# Patient Record
Sex: MALE | Race: WHITE | NOT HISPANIC OR LATINO | URBAN - METROPOLITAN AREA
[De-identification: names, ages, dates, MRNs, and addresses within clinical notes are randomized per-mention and may not be internally consistent; named-entity substitution may affect disease eponyms.]

---

## 2018-08-10 ENCOUNTER — EMERGENCY (EMERGENCY)
Facility: HOSPITAL | Age: 63
LOS: 1 days | End: 2018-08-10
Attending: EMERGENCY MEDICINE | Admitting: EMERGENCY MEDICINE
Payer: COMMERCIAL

## 2018-08-10 VITALS
TEMPERATURE: 98 F | RESPIRATION RATE: 16 BRPM | OXYGEN SATURATION: 100 % | HEIGHT: 70 IN | WEIGHT: 179.9 LBS | DIASTOLIC BLOOD PRESSURE: 72 MMHG | SYSTOLIC BLOOD PRESSURE: 93 MMHG | HEART RATE: 84 BPM

## 2018-08-10 VITALS — OXYGEN SATURATION: 100 % | TEMPERATURE: 99 F | HEART RATE: 69 BPM | RESPIRATION RATE: 16 BRPM

## 2018-08-10 DIAGNOSIS — Z98.89 OTHER SPECIFIED POSTPROCEDURAL STATES: Chronic | ICD-10-CM

## 2018-08-10 DIAGNOSIS — I48.0 PAROXYSMAL ATRIAL FIBRILLATION: ICD-10-CM

## 2018-08-10 DIAGNOSIS — I10 ESSENTIAL (PRIMARY) HYPERTENSION: ICD-10-CM

## 2018-08-10 DIAGNOSIS — E78.00 PURE HYPERCHOLESTEROLEMIA, UNSPECIFIED: ICD-10-CM

## 2018-08-10 LAB
ALBUMIN SERPL ELPH-MCNC: 3.6 G/DL — SIGNIFICANT CHANGE UP (ref 3.3–5)
ALP SERPL-CCNC: 46 U/L — SIGNIFICANT CHANGE UP (ref 30–120)
ALT FLD-CCNC: 51 U/L DA — SIGNIFICANT CHANGE UP (ref 10–60)
ANION GAP SERPL CALC-SCNC: 6 MMOL/L — SIGNIFICANT CHANGE UP (ref 5–17)
APTT BLD: 25.7 SEC — LOW (ref 27.5–37.4)
AST SERPL-CCNC: 22 U/L — SIGNIFICANT CHANGE UP (ref 10–40)
BASOPHILS # BLD AUTO: 0.05 K/UL — SIGNIFICANT CHANGE UP (ref 0–0.2)
BASOPHILS NFR BLD AUTO: 0.4 % — SIGNIFICANT CHANGE UP (ref 0–2)
BILIRUB SERPL-MCNC: 1.2 MG/DL — SIGNIFICANT CHANGE UP (ref 0.2–1.2)
BUN SERPL-MCNC: 32 MG/DL — HIGH (ref 7–23)
CALCIUM SERPL-MCNC: 8.8 MG/DL — SIGNIFICANT CHANGE UP (ref 8.4–10.5)
CHLORIDE SERPL-SCNC: 104 MMOL/L — SIGNIFICANT CHANGE UP (ref 96–108)
CK MB BLD-MCNC: 3.9 % — HIGH (ref 0–3.5)
CK MB CFR SERPL CALC: 2 NG/ML — SIGNIFICANT CHANGE UP (ref 0–3.6)
CK SERPL-CCNC: 51 U/L — SIGNIFICANT CHANGE UP (ref 39–308)
CO2 SERPL-SCNC: 27 MMOL/L — SIGNIFICANT CHANGE UP (ref 22–31)
CREAT SERPL-MCNC: 1.72 MG/DL — HIGH (ref 0.5–1.3)
D DIMER BLD IA.RAPID-MCNC: 214 NG/ML DDU — SIGNIFICANT CHANGE UP
EOSINOPHIL # BLD AUTO: 0.16 K/UL — SIGNIFICANT CHANGE UP (ref 0–0.5)
EOSINOPHIL NFR BLD AUTO: 1.2 % — SIGNIFICANT CHANGE UP (ref 0–6)
GLUCOSE SERPL-MCNC: 119 MG/DL — HIGH (ref 70–99)
HCT VFR BLD CALC: 44.3 % — SIGNIFICANT CHANGE UP (ref 39–50)
HGB BLD-MCNC: 15.1 G/DL — SIGNIFICANT CHANGE UP (ref 13–17)
IMM GRANULOCYTES NFR BLD AUTO: 1.3 % — SIGNIFICANT CHANGE UP (ref 0–1.5)
INR BLD: 1 RATIO — SIGNIFICANT CHANGE UP (ref 0.88–1.16)
LYMPHOCYTES # BLD AUTO: 16.7 % — SIGNIFICANT CHANGE UP (ref 13–44)
LYMPHOCYTES # BLD AUTO: 2.25 K/UL — SIGNIFICANT CHANGE UP (ref 1–3.3)
MAGNESIUM SERPL-MCNC: 2.3 MG/DL — SIGNIFICANT CHANGE UP (ref 1.6–2.6)
MCHC RBC-ENTMCNC: 32.5 PG — SIGNIFICANT CHANGE UP (ref 27–34)
MCHC RBC-ENTMCNC: 34.1 GM/DL — SIGNIFICANT CHANGE UP (ref 32–36)
MCV RBC AUTO: 95.3 FL — SIGNIFICANT CHANGE UP (ref 80–100)
MONOCYTES # BLD AUTO: 1.2 K/UL — HIGH (ref 0–0.9)
MONOCYTES NFR BLD AUTO: 8.9 % — SIGNIFICANT CHANGE UP (ref 2–14)
NEUTROPHILS # BLD AUTO: 9.6 K/UL — HIGH (ref 1.8–7.4)
NEUTROPHILS NFR BLD AUTO: 71.5 % — SIGNIFICANT CHANGE UP (ref 43–77)
PHOSPHATE SERPL-MCNC: 3.6 MG/DL — SIGNIFICANT CHANGE UP (ref 2.5–4.5)
PLATELET # BLD AUTO: 258 K/UL — SIGNIFICANT CHANGE UP (ref 150–400)
POTASSIUM SERPL-MCNC: 4.4 MMOL/L — SIGNIFICANT CHANGE UP (ref 3.5–5.3)
POTASSIUM SERPL-SCNC: 4.4 MMOL/L — SIGNIFICANT CHANGE UP (ref 3.5–5.3)
PROT SERPL-MCNC: 6.8 G/DL — SIGNIFICANT CHANGE UP (ref 6–8.3)
PROTHROM AB SERPL-ACNC: 10.9 SEC — SIGNIFICANT CHANGE UP (ref 9.8–12.7)
RBC # BLD: 4.65 M/UL — SIGNIFICANT CHANGE UP (ref 4.2–5.8)
RBC # FLD: 11.9 % — SIGNIFICANT CHANGE UP (ref 10.3–14.5)
SODIUM SERPL-SCNC: 137 MMOL/L — SIGNIFICANT CHANGE UP (ref 135–145)
TROPONIN I SERPL-MCNC: 0 NG/ML — LOW (ref 0.02–0.06)
WBC # BLD: 13.44 K/UL — HIGH (ref 3.8–10.5)
WBC # FLD AUTO: 13.44 K/UL — HIGH (ref 3.8–10.5)

## 2018-08-10 PROCEDURE — 80053 COMPREHEN METABOLIC PANEL: CPT

## 2018-08-10 PROCEDURE — 85379 FIBRIN DEGRADATION QUANT: CPT

## 2018-08-10 PROCEDURE — 85730 THROMBOPLASTIN TIME PARTIAL: CPT

## 2018-08-10 PROCEDURE — 36415 COLL VENOUS BLD VENIPUNCTURE: CPT

## 2018-08-10 PROCEDURE — 93010 ELECTROCARDIOGRAM REPORT: CPT

## 2018-08-10 PROCEDURE — 93005 ELECTROCARDIOGRAM TRACING: CPT

## 2018-08-10 PROCEDURE — 85610 PROTHROMBIN TIME: CPT

## 2018-08-10 PROCEDURE — 99223 1ST HOSP IP/OBS HIGH 75: CPT

## 2018-08-10 PROCEDURE — 99284 EMERGENCY DEPT VISIT MOD MDM: CPT | Mod: 25

## 2018-08-10 PROCEDURE — 99285 EMERGENCY DEPT VISIT HI MDM: CPT

## 2018-08-10 PROCEDURE — 71045 X-RAY EXAM CHEST 1 VIEW: CPT | Mod: 26

## 2018-08-10 PROCEDURE — 84100 ASSAY OF PHOSPHORUS: CPT

## 2018-08-10 PROCEDURE — 85027 COMPLETE CBC AUTOMATED: CPT

## 2018-08-10 PROCEDURE — 82553 CREATINE MB FRACTION: CPT

## 2018-08-10 PROCEDURE — 84443 ASSAY THYROID STIM HORMONE: CPT

## 2018-08-10 PROCEDURE — 82550 ASSAY OF CK (CPK): CPT

## 2018-08-10 PROCEDURE — 71045 X-RAY EXAM CHEST 1 VIEW: CPT

## 2018-08-10 PROCEDURE — 83735 ASSAY OF MAGNESIUM: CPT

## 2018-08-10 PROCEDURE — 84484 ASSAY OF TROPONIN QUANT: CPT

## 2018-08-10 RX ORDER — SODIUM CHLORIDE 9 MG/ML
1000 INJECTION INTRAMUSCULAR; INTRAVENOUS; SUBCUTANEOUS ONCE
Qty: 0 | Refills: 0 | Status: COMPLETED | OUTPATIENT
Start: 2018-08-10 | End: 2018-08-10

## 2018-08-10 RX ADMIN — SODIUM CHLORIDE 1000 MILLILITER(S): 9 INJECTION INTRAMUSCULAR; INTRAVENOUS; SUBCUTANEOUS at 14:06

## 2018-08-10 RX ADMIN — SODIUM CHLORIDE 1000 MILLILITER(S): 9 INJECTION INTRAMUSCULAR; INTRAVENOUS; SUBCUTANEOUS at 15:06

## 2018-08-10 NOTE — ED PROVIDER NOTE - CONDUCTED A DETAILED DISCUSSION WITH PATIENT AND/OR GUARDIAN REGARDING, MDM
lab results/radiology results need for outpatient follow-up/radiology results/lab results/return to ED if symptoms worsen, persist or questions arise

## 2018-08-10 NOTE — CONSULT NOTE ADULT - SUBJECTIVE AND OBJECTIVE BOX
PCP:    REQUESTING PHYSICIAN:    REASON FOR CONSULT:    CHIEF COMPLAINT:    HPI: 64 yo male presents with complaints of palpitations for the past several days. Pt has a history of P Afib and recently has had neck pain and given pain meds, steroids and has been receiving PT. He denies chest pain or shortness of breath. His lifestyle and activities have not changed. He has followed up with his PMD for the past 3 years. Pt is now in NSR    PAST MEDICAL & SURGICAL HISTORY:  Atrial fibrillation  Hypercholesteremia  Hypertension  History of back surgery      Allergies    No Known Allergies    Intolerances        SOCIAL HISTORY:    FAMILY HISTORY:  No pertinent family history in first degree relatives      MEDICATIONS:  MEDICATIONS  (STANDING):    MEDICATIONS  (PRN):      REVIEW OF SYSTEMS:    CONSTITUTIONAL: No weakness, fevers or chills  EYES/ENT: No visual changes;  No vertigo or throat pain   NECK: No pain or stiffness  RESPIRATORY: No cough, wheezing, hemoptysis; No shortness of breath  CARDIOVASCULAR:palpitations  GASTROINTESTINAL: No abdominal or epigastric pain. No nausea, vomiting, or hematemesis; No diarrhea or constipation. No melena or hematochezia.  GENITOURINARY: No dysuria, frequency or hematuria  NEUROLOGICAL: No numbness or weakness  SKIN: No itching, burning, rashes, or lesions   All other review of systems is negative unless indicated above    Vital Signs Last 24 Hrs  T(C): 36.6 (10 Aug 2018 11:10), Max: 36.6 (10 Aug 2018 11:10)  T(F): 97.8 (10 Aug 2018 11:10), Max: 97.8 (10 Aug 2018 11:10)  HR: 70 (10 Aug 2018 12:26) (70 - 84)  BP: 91/61 (10 Aug 2018 12:26) (91/61 - 106/78)  BP(mean): --  RR: 16 (10 Aug 2018 12:26) (16 - 16)  SpO2: 97% (10 Aug 2018 12:26) (97% - 100%)    I&O's Summary      PHYSICAL EXAM:    Constitutional: NAD, awake and alert, well-developed  HEENT: PERR, EOMI,  No oral cyananosis.  Neck:  supple,  No JVD  Respiratory: Breath sounds are clear bilaterally, No wheezing, rales or rhonchi  Cardiovascular: S1 and S2, regular rate and rhythm, no Murmurs, gallops or rubs  Gastrointestinal: Bowel Sounds present, soft, nontender.   Extremities: No peripheral edema. No clubbing or cyanosis.  Vascular: 2+ peripheral pulses  Neurological: A/O x 3, no focal deficits  Musculoskeletal: no calf tenderness.  Skin: No rashes.      LABS: All Labs Reviewed:                        15.1   13.44 )-----------( 258      ( 10 Aug 2018 11:34 )             44.3     10 Aug 2018 11:34    137    |  104    |  32     ----------------------------<  119    4.4     |  27     |  1.72     Ca    8.8        10 Aug 2018 11:34  Phos  3.6       10 Aug 2018 11:55  Mg     2.3       10 Aug 2018 11:55    TPro  6.8    /  Alb  3.6    /  TBili  1.2    /  DBili  x      /  AST  22     /  ALT  51     /  AlkPhos  46     10 Aug 2018 11:34    PT/INR - ( 10 Aug 2018 11:34 )   PT: 10.9 sec;   INR: 1.00 ratio         PTT - ( 10 Aug 2018 11:34 )  PTT:25.7 sec  CARDIAC MARKERS ( 10 Aug 2018 11:34 )  .000 ng/mL / x     / 51 U/L / x     / 2.0 ng/mL      Blood Culture:         RADIOLOGY/EKG:

## 2018-08-10 NOTE — CONSULT NOTE ADULT - PROBLEM SELECTOR RECOMMENDATION 9
Now in NSR. He will remain on Toprol 100 mg daily. He agrees to follow up with Dr. Murray next week.

## 2018-08-10 NOTE — ED PROVIDER NOTE - CHPI ED SYMPTOMS NEG
no nausea/no syncope/no diaphoresis/no fever/no back pain/no chest pain/no cough/no chills/no vomiting

## 2018-08-10 NOTE — ED ADULT NURSE NOTE - NSIMPLEMENTINTERV_GEN_ALL_ED
Implemented All Universal Safety Interventions:  [place] to call system. Call bell, personal items and telephone within reach. Instruct patient to call for assistance. Room bathroom lighting operational. Non-slip footwear when patient is off stretcher. Physically safe environment: no spills, clutter or unnecessary equipment. Stretcher in lowest position, wheels locked, appropriate side rails in place.

## 2018-08-10 NOTE — ED PROVIDER NOTE - OBJECTIVE STATEMENT
64 y/o M with hx of Afib, HTN, HLD presents with c/o palpitations x few days. States that he has been having intermittent palpitations and rapid heart beat for a few days, worse yesterday. States that he his heart rate would go from 150s-190-s and then to 70's. Pt states that he feels fine now in the ER. States that he saw his PMD, Dr. Leonardo Frankel on 7/13 and noticed to be in mild Afib and had his toprol increased to 100mg daily. Pt last saw Dr. Murray, Cardiologist 3 years ago. States that he called cardiology office today and asked to come to ED. Pt states that he has associated shortness of breath on exertion and occasional dizziness. Denies fever, chest pain, n/v, diaphoresis, syncope, cough, leg swelling/pain or other symptoms. Takes aspirin 325mg daily.

## 2018-08-10 NOTE — ED PROVIDER NOTE - MEDICAL DECISION MAKING DETAILS
62 y/o m with hx of afib, htn here with intermittent rapid heart beat and palpitations x few days, worse since yesterday with SOB on exertion, saw pmd last month was in mild afib and toprol increased to 100mg qd, last saw Cardiologist, Dr. Murray 3 years ago; will get ekg, CE, cardiology consult, re-assess

## 2018-08-10 NOTE — ED PROVIDER NOTE - PROGRESS NOTE DETAILS
Attending Note: 64 y/o M hx of a fib c/o palpitations and rapid heart beat x few days. SOB on exertion. Seen in PMD office last month and noted to be in a fib had his Toprol increased to 100 mg daily. Denies CP, fever. PE - heart beat is regular 70 bpm. lungs clear. Impression - paroxysmal a fib. Plan - CE's, cardiac evaluation by Dr. Murray. Spoke to Cardiologist, Dr. Murray, discussed case and requested a consult. Pt examined by ED attending, Dr. Davenport who agreed with disposition and plan. Pt seen by Dr. Alvarez in ED. Pt in NSR in ED. Labs/ekg reviewed. Pt cleared for discharge by cardiologist, to continue on toprol 100mg daily, f/u Dr. Murray next week. SEE CARDIOLOGY CONSULT NOTE.

## 2018-08-10 NOTE — ED ADULT NURSE NOTE - OBJECTIVE STATEMENT
Pt presents with complaint of feeling dizzy since yesterday  intermittently and feels heart rate is rapid at times. CM RSR with no ectopics noted. Lungs clear on auscultation

## 2018-08-12 ENCOUNTER — RESULT CHARGE (OUTPATIENT)
Age: 63
End: 2018-08-12

## 2018-08-12 LAB — TSH SERPL-MCNC: 2.49 UIU/ML — SIGNIFICANT CHANGE UP (ref 0.27–4.2)

## 2018-08-13 ENCOUNTER — APPOINTMENT (OUTPATIENT)
Dept: CARDIOLOGY | Facility: CLINIC | Age: 63
End: 2018-08-13
Payer: COMMERCIAL

## 2018-08-13 ENCOUNTER — NON-APPOINTMENT (OUTPATIENT)
Age: 63
End: 2018-08-13

## 2018-08-13 VITALS
DIASTOLIC BLOOD PRESSURE: 77 MMHG | HEIGHT: 70 IN | BODY MASS INDEX: 25.77 KG/M2 | OXYGEN SATURATION: 100 % | HEART RATE: 73 BPM | SYSTOLIC BLOOD PRESSURE: 144 MMHG | WEIGHT: 180 LBS

## 2018-08-13 PROBLEM — I48.91 UNSPECIFIED ATRIAL FIBRILLATION: Chronic | Status: ACTIVE | Noted: 2018-08-10

## 2018-08-13 PROCEDURE — 99214 OFFICE O/P EST MOD 30 MIN: CPT | Mod: 25

## 2018-08-13 PROCEDURE — 93000 ELECTROCARDIOGRAM COMPLETE: CPT

## 2018-08-23 ENCOUNTER — APPOINTMENT (OUTPATIENT)
Dept: CARDIOLOGY | Facility: CLINIC | Age: 63
End: 2018-08-23
Payer: COMMERCIAL

## 2018-08-23 PROCEDURE — 93306 TTE W/DOPPLER COMPLETE: CPT

## 2018-08-28 ENCOUNTER — APPOINTMENT (OUTPATIENT)
Dept: CARDIOLOGY | Facility: CLINIC | Age: 63
End: 2018-08-28
Payer: COMMERCIAL

## 2018-08-28 PROCEDURE — 93015 CV STRESS TEST SUPVJ I&R: CPT

## 2018-08-28 PROCEDURE — 78452 HT MUSCLE IMAGE SPECT MULT: CPT

## 2018-08-28 PROCEDURE — A9500: CPT

## 2018-09-24 ENCOUNTER — RESULT CHARGE (OUTPATIENT)
Age: 63
End: 2018-09-24

## 2018-09-24 ENCOUNTER — APPOINTMENT (OUTPATIENT)
Dept: CARDIOLOGY | Facility: CLINIC | Age: 63
End: 2018-09-24
Payer: COMMERCIAL

## 2018-09-24 VITALS
BODY MASS INDEX: 26.05 KG/M2 | SYSTOLIC BLOOD PRESSURE: 124 MMHG | HEART RATE: 72 BPM | OXYGEN SATURATION: 100 % | HEIGHT: 70 IN | DIASTOLIC BLOOD PRESSURE: 75 MMHG | WEIGHT: 182 LBS

## 2018-09-24 PROCEDURE — 99214 OFFICE O/P EST MOD 30 MIN: CPT

## 2018-10-29 ENCOUNTER — APPOINTMENT (OUTPATIENT)
Dept: CARDIOLOGY | Facility: CLINIC | Age: 63
End: 2018-10-29
Payer: COMMERCIAL

## 2018-10-29 ENCOUNTER — NON-APPOINTMENT (OUTPATIENT)
Age: 63
End: 2018-10-29

## 2018-10-29 VITALS — HEART RATE: 66 BPM | OXYGEN SATURATION: 99 % | DIASTOLIC BLOOD PRESSURE: 79 MMHG | SYSTOLIC BLOOD PRESSURE: 136 MMHG

## 2018-10-29 VITALS — WEIGHT: 183 LBS | HEIGHT: 70 IN | BODY MASS INDEX: 26.2 KG/M2

## 2018-10-29 PROCEDURE — 99215 OFFICE O/P EST HI 40 MIN: CPT | Mod: 25

## 2018-10-29 PROCEDURE — 93000 ELECTROCARDIOGRAM COMPLETE: CPT

## 2018-11-03 NOTE — HISTORY OF PRESENT ILLNESS
[FreeTextEntry1] : This is a 62 Y/O gentleman who previously presented ER with CC of palpitations.  \par  Pt returns with complaints of lightheadedness associated with episodes of atrial fibrillation recorded on his phone angle. He denies chest pain or shortness of breath. He denies syncope

## 2018-11-03 NOTE — DISCUSSION/SUMMARY
[FreeTextEntry1] :  Pt will continue current medications. He will be referred to Dr. Chan for possible LINQ insertion.

## 2018-11-03 NOTE — PHYSICAL EXAM
[General Appearance - Well Developed] : well developed [Normal Appearance] : normal appearance [Well Groomed] : well groomed [General Appearance - Well Nourished] : well nourished [No Deformities] : no deformities [General Appearance - In No Acute Distress] : no acute distress [Normal Conjunctiva] : the conjunctiva exhibited no abnormalities [] : no respiratory distress [Respiration, Rhythm And Depth] : normal respiratory rhythm and effort [Exaggerated Use Of Accessory Muscles For Inspiration] : no accessory muscle use [Auscultation Breath Sounds / Voice Sounds] : lungs were clear to auscultation bilaterally [Heart Rate And Rhythm] : heart rate and rhythm were normal [Heart Sounds] : normal S1 and S2 [Abdomen Soft] : soft [Abnormal Walk] : normal gait [FreeTextEntry1] : No LE Edema  [Skin Color & Pigmentation] : normal skin color and pigmentation [Oriented To Time, Place, And Person] : oriented to person, place, and time

## 2018-11-03 NOTE — REASON FOR VISIT
[Follow-Up - Clinic] : a clinic follow-up of [Atrial Fibrillation] : atrial fibrillation [Hyperlipidemia] : hyperlipidemia [Hypertension] : hypertension [Medication Management] : Medication management

## 2020-01-28 ENCOUNTER — APPOINTMENT (OUTPATIENT)
Dept: CARDIOLOGY | Facility: CLINIC | Age: 65
End: 2020-01-28
Payer: COMMERCIAL

## 2020-01-28 ENCOUNTER — NON-APPOINTMENT (OUTPATIENT)
Age: 65
End: 2020-01-28

## 2020-01-28 VITALS — HEIGHT: 70 IN | WEIGHT: 185 LBS | BODY MASS INDEX: 26.48 KG/M2

## 2020-01-28 VITALS — OXYGEN SATURATION: 99 % | SYSTOLIC BLOOD PRESSURE: 136 MMHG | DIASTOLIC BLOOD PRESSURE: 90 MMHG | HEART RATE: 92 BPM

## 2020-01-28 PROCEDURE — 99214 OFFICE O/P EST MOD 30 MIN: CPT

## 2020-01-28 PROCEDURE — 93000 ELECTROCARDIOGRAM COMPLETE: CPT

## 2020-01-28 NOTE — HISTORY OF PRESENT ILLNESS
[FreeTextEntry1] : This is a 63 Y/O gentleman who previously presented ER with CC of palpitations.  \par  Pt returns with complaints of mild shortness of breath with episodes of atrial fibrillation recorded on his phone angle since today, early morning. EKG today Afib heart rate up to 111. Did not go to ED  because most of the time upon arrival at ED he is converted to sinus rhythm. Was referred to Dr. Chan for possible LINQ insertion last visit but not seen yet. \par History of Afib (No AC was taken off 2014 on Aspirin).

## 2020-01-28 NOTE — ASSESSMENT
[FreeTextEntry1] : Afib : Will increase Metoprolol Xl to 150 mg daily. Continue Aspirin. If symptoms don’t resolve or worsens patient recommended to go to ED. \par Recommended to follow up with  Dr. Chan, information given. \par Has appointment with PCP tomorrow. Will have blood works with PCP. \par Follow up in 2 weeks.

## 2020-01-31 ENCOUNTER — EMERGENCY (EMERGENCY)
Facility: HOSPITAL | Age: 65
LOS: 1 days | Discharge: HOME | End: 2020-01-31
Attending: EMERGENCY MEDICINE | Admitting: EMERGENCY MEDICINE
Payer: COMMERCIAL

## 2020-01-31 VITALS
SYSTOLIC BLOOD PRESSURE: 141 MMHG | DIASTOLIC BLOOD PRESSURE: 74 MMHG | OXYGEN SATURATION: 100 % | TEMPERATURE: 98 F | HEART RATE: 61 BPM | RESPIRATION RATE: 14 BRPM

## 2020-01-31 VITALS
RESPIRATION RATE: 14 BRPM | DIASTOLIC BLOOD PRESSURE: 84 MMHG | OXYGEN SATURATION: 99 % | HEART RATE: 98 BPM | SYSTOLIC BLOOD PRESSURE: 153 MMHG | TEMPERATURE: 98 F | HEIGHT: 70 IN | WEIGHT: 184.97 LBS

## 2020-01-31 DIAGNOSIS — Z98.89 OTHER SPECIFIED POSTPROCEDURAL STATES: Chronic | ICD-10-CM

## 2020-01-31 DIAGNOSIS — Z98.890 OTHER SPECIFIED POSTPROCEDURAL STATES: Chronic | ICD-10-CM

## 2020-01-31 DIAGNOSIS — I48.91 UNSPECIFIED ATRIAL FIBRILLATION: ICD-10-CM

## 2020-01-31 DIAGNOSIS — I10 ESSENTIAL (PRIMARY) HYPERTENSION: ICD-10-CM

## 2020-01-31 LAB
ALBUMIN SERPL ELPH-MCNC: 3.8 G/DL — SIGNIFICANT CHANGE UP (ref 3.3–5)
ALP SERPL-CCNC: 53 U/L — SIGNIFICANT CHANGE UP (ref 30–120)
ALT FLD-CCNC: 30 U/L DA — SIGNIFICANT CHANGE UP (ref 10–60)
ANION GAP SERPL CALC-SCNC: 8 MMOL/L — SIGNIFICANT CHANGE UP (ref 5–17)
AST SERPL-CCNC: 19 U/L — SIGNIFICANT CHANGE UP (ref 10–40)
BILIRUB SERPL-MCNC: 1.1 MG/DL — SIGNIFICANT CHANGE UP (ref 0.2–1.2)
BUN SERPL-MCNC: 15 MG/DL — SIGNIFICANT CHANGE UP (ref 7–23)
CALCIUM SERPL-MCNC: 9.2 MG/DL — SIGNIFICANT CHANGE UP (ref 8.4–10.5)
CHLORIDE SERPL-SCNC: 105 MMOL/L — SIGNIFICANT CHANGE UP (ref 96–108)
CO2 SERPL-SCNC: 29 MMOL/L — SIGNIFICANT CHANGE UP (ref 22–31)
CREAT SERPL-MCNC: 1 MG/DL — SIGNIFICANT CHANGE UP (ref 0.5–1.3)
GLUCOSE SERPL-MCNC: 104 MG/DL — HIGH (ref 70–99)
HCT VFR BLD CALC: 44.5 % — SIGNIFICANT CHANGE UP (ref 39–50)
HGB BLD-MCNC: 14.9 G/DL — SIGNIFICANT CHANGE UP (ref 13–17)
MAGNESIUM SERPL-MCNC: 2.3 MG/DL — SIGNIFICANT CHANGE UP (ref 1.6–2.6)
MCHC RBC-ENTMCNC: 32 PG — SIGNIFICANT CHANGE UP (ref 27–34)
MCHC RBC-ENTMCNC: 33.5 GM/DL — SIGNIFICANT CHANGE UP (ref 32–36)
MCV RBC AUTO: 95.7 FL — SIGNIFICANT CHANGE UP (ref 80–100)
NRBC # BLD: 0 /100 WBCS — SIGNIFICANT CHANGE UP (ref 0–0)
PLATELET # BLD AUTO: 202 K/UL — SIGNIFICANT CHANGE UP (ref 150–400)
POTASSIUM SERPL-MCNC: 4.1 MMOL/L — SIGNIFICANT CHANGE UP (ref 3.5–5.3)
POTASSIUM SERPL-SCNC: 4.1 MMOL/L — SIGNIFICANT CHANGE UP (ref 3.5–5.3)
PROT SERPL-MCNC: 7 G/DL — SIGNIFICANT CHANGE UP (ref 6–8.3)
RBC # BLD: 4.65 M/UL — SIGNIFICANT CHANGE UP (ref 4.2–5.8)
RBC # FLD: 11.9 % — SIGNIFICANT CHANGE UP (ref 10.3–14.5)
SODIUM SERPL-SCNC: 142 MMOL/L — SIGNIFICANT CHANGE UP (ref 135–145)
TROPONIN I SERPL-MCNC: 0 NG/ML — LOW (ref 0.02–0.06)
WBC # BLD: 7 K/UL — SIGNIFICANT CHANGE UP (ref 3.8–10.5)
WBC # FLD AUTO: 7 K/UL — SIGNIFICANT CHANGE UP (ref 3.8–10.5)

## 2020-01-31 PROCEDURE — 99284 EMERGENCY DEPT VISIT MOD MDM: CPT

## 2020-01-31 PROCEDURE — 84484 ASSAY OF TROPONIN QUANT: CPT

## 2020-01-31 PROCEDURE — 71046 X-RAY EXAM CHEST 2 VIEWS: CPT | Mod: 26

## 2020-01-31 PROCEDURE — 83735 ASSAY OF MAGNESIUM: CPT

## 2020-01-31 PROCEDURE — 85027 COMPLETE CBC AUTOMATED: CPT

## 2020-01-31 PROCEDURE — 80053 COMPREHEN METABOLIC PANEL: CPT

## 2020-01-31 PROCEDURE — 71046 X-RAY EXAM CHEST 2 VIEWS: CPT

## 2020-01-31 PROCEDURE — 36415 COLL VENOUS BLD VENIPUNCTURE: CPT

## 2020-01-31 PROCEDURE — 99223 1ST HOSP IP/OBS HIGH 75: CPT

## 2020-01-31 PROCEDURE — 93010 ELECTROCARDIOGRAM REPORT: CPT

## 2020-01-31 PROCEDURE — 99284 EMERGENCY DEPT VISIT MOD MDM: CPT | Mod: 25

## 2020-01-31 PROCEDURE — 93005 ELECTROCARDIOGRAM TRACING: CPT

## 2020-01-31 RX ORDER — SODIUM CHLORIDE 9 MG/ML
1000 INJECTION INTRAMUSCULAR; INTRAVENOUS; SUBCUTANEOUS ONCE
Refills: 0 | Status: COMPLETED | OUTPATIENT
Start: 2020-01-31 | End: 2020-01-31

## 2020-01-31 RX ORDER — METOPROLOL TARTRATE 50 MG
1 TABLET ORAL
Qty: 0 | Refills: 0 | DISCHARGE

## 2020-01-31 RX ORDER — LISINOPRIL 2.5 MG/1
1 TABLET ORAL
Qty: 0 | Refills: 0 | DISCHARGE

## 2020-01-31 RX ADMIN — SODIUM CHLORIDE 1000 MILLILITER(S): 9 INJECTION INTRAMUSCULAR; INTRAVENOUS; SUBCUTANEOUS at 13:20

## 2020-01-31 RX ADMIN — SODIUM CHLORIDE 1000 MILLILITER(S): 9 INJECTION INTRAMUSCULAR; INTRAVENOUS; SUBCUTANEOUS at 12:20

## 2020-01-31 NOTE — ED PROVIDER NOTE - CLINICAL SUMMARY MEDICAL DECISION MAKING FREE TEXT BOX
sensation of irregular heartbeat 4 days with some mild occ shortness of breath. history of a-fib. symptoms seem to have resolved on arrival to ED. no chest pain. will order EKG, labs, trop, CXR, consult cardiology

## 2020-01-31 NOTE — ED ADULT NURSE NOTE - CHPI ED NUR SYMPTOMS NEG
no dizziness/no diaphoresis/no back pain/no syncope/no vomiting/no chest pain/no chills/no congestion/no fever/no nausea

## 2020-01-31 NOTE — ED PROVIDER NOTE - NSFOLLOWUPINSTRUCTIONS_ED_ALL_ED_FT
drink plenty of fluids.   continue toprol as prescribed  start flecainde daily  follow up with your cardiologist next week      A-fib (Atrial Fibrillation)    WHAT YOU NEED TO KNOW:    A-fib may come and go, or it may be a long-term condition. A-fib can cause blood clots, stroke, or heart failure. These conditions may become life-threatening. It is important to treat and manage a-fib to help prevent a blood clot, stroke, or heart failure. Heart Chambers         DISCHARGE INSTRUCTIONS:    Call 911 for any of the following:     You have any of the following signs of a heart attack:   Squeezing, pressure, or pain in your chest      You may also have any of the following:   Discomfort or pain in your back, neck, jaw, stomach, or arm      Shortness of breath      Nausea or vomiting      Lightheadedness or a sudden cold sweat      You have any of the following signs of a stroke:   Numbness or drooping on one side of your face       Weakness in an arm or leg      Confusion or difficulty speaking      Dizziness, a severe headache, or vision loss    Return to the emergency department if: You have any of the following signs of a blood clot:     You feel lightheaded, are short of breath, and have chest pain.      You cough up blood.      You have swelling, redness, pain, or warmth in your arm or leg.    Contact your cardiologist or healthcare provider if:     Your heart rate is more than 110 beats per minute.      You have new or worsening swelling in your legs, feet, ankles, or abdomen.       You are short of breath, even at rest.       You have questions or concerns about your condition or care.     Medicines: You may need any of the following:     Heart medicines help control your heart rate or rhythm. You may need more than one medicine to treat your symptoms.       Blood thinners help prevent blood clots. Clots can cause strokes, heart attacks, and death. The following are general safety guidelines to follow while you are taking a blood thinner:  Watch for bleeding and bruising while you take blood thinners. Watch for bleeding from your gums or nose. Watch for blood in your urine and bowel movements. Use a soft washcloth on your skin, and a soft toothbrush to brush your teeth. This can keep your skin and gums from bleeding. If you shave, use an electric shaver. Do not play contact sports.       Tell your dentist and other healthcare providers that you take a blood thinner. Wear a bracelet or necklace that says you take this medicine.       Do not start or stop any other medicines unless your healthcare provider tells you to. Many medicines cannot be used with blood thinners.      Take your blood thinner exactly as prescribed by your healthcare provider. Do not skip does or take less than prescribed. Tell your provider right away if you forget to take your blood thinner, or if you take too much.      Warfarin is a blood thinner that you may need to take. The following are things you should be aware of if you take warfarin:   Foods and medicines can affect the amount of warfarin in your blood. Do not make major changes to your diet while you take warfarin. Warfarin works best when you eat about the same amount of vitamin K every day. Vitamin K is found in green leafy vegetables and certain other foods. Ask for more information about what to eat when you are taking warfarin.      You will need to see your healthcare provider for follow-up visits when you are on warfarin. You will need regular blood tests. These tests are used to decide how much medicine you need.       Antiplatelets, such as aspirin, help prevent blood clots. Take your antiplatelet medicine exactly as directed. These medicines make it more likely for you to bleed or bruise. If you are told to take aspirin, do not take acetaminophen or ibuprofen instead.       Take your medicine as directed. Contact your healthcare provider if you think your medicine is not helping or if you have side effects. Tell him or her if you are allergic to any medicine. Keep a list of the medicines, vitamins, and herbs you take. Include the amounts, and when and why you take them. Bring the list or the pill bottles to follow-up visits. Carry your medicine list with you in case of an emergency.    Follow up with your cardiologist as directed: You will need regular blood tests and monitoring. Write down your questions so you remember to ask them during your visits.     Manage A-fib:     Know your target heart rate. Learn how to take your pulse and monitor your heart rate.      Manage other health conditions. This includes high blood pressure, sleep apnea, thyroid disease, diabetes, and other heart conditions. Take medicine as directed and follow your treatment plan.       Limit or do not drink alcohol. Alcohol can make a-fib hard to manage. Ask your healthcare provider if it is safe for you to drink alcohol. A drink of alcohol is 12 ounces of beer, 5 ounces of wine, or 1½ ounces of liquor.       Do not smoke. Nicotine and other chemicals in cigarettes and cigars can cause heart and lung damage. Ask your healthcare provider for information if you currently smoke and need help to quit. E-cigarettes or smokeless tobacco still contain nicotine. Talk to your healthcare provider before you use these products.       Eat heart-healthy foods. Heart healthy foods will help keep your cholesterol low. These include fruits, vegetables, whole-grain breads, low-fat dairy products, beans, lean meats, and fish. Replace butter and margarine with heart-healthy oils such as olive oil and canola oil.       Maintain a healthy weight. Ask your healthcare provider how much you should weigh. Ask him or her to help you create a weight loss plan if you are overweight.       Exercise for 30 minutes most days of the week. Ask your healthcare provider about the best exercise plan for you.

## 2020-01-31 NOTE — ED PROVIDER NOTE - OBJECTIVE STATEMENT
64 year old male with history of HTN, HLD, and A-fib presents with sensation of irregular heart beat and shortness of breath with exertion on and off the past 4 days. no chest pain. no modifying factors. history of similar symptoms in the past due to a-fib. feels like "he's in a-fib". states he usually converts on his own. was seen in his cardiologist's office 2 days ago because he was in the area. was told to increase his toprol dose from 100mg to 150 mg, rest, and hydrate. patient reports no improvement so came to ED. on arrival to ED, patient states symptoms seem to have resolved.  PCP Keven Alvarez

## 2020-01-31 NOTE — ED PROVIDER NOTE - ATTENDING CONTRIBUTION TO CARE
65 yo M p/w co slight palpitations x past ~ 4 days. very mild assoc dyspnea. No chest pain. no abd pain. no n/v/d. no fever/chills/recent illness. no numb/ting/focal weak. no recent trauma. no recent travel / imob. no agg/allev factors. no other co. Pts sx resolved after arrival to ed.  Exam: MM Moist. neck supple . no meningeal signs. abd soft NT, no hsm. no cvat. RRR S1S2. nl lung exam. No other acute findings.  Labs, xr, cardio consultation

## 2020-01-31 NOTE — CONSULT NOTE ADULT - PROBLEM SELECTOR RECOMMENDATION 9
paroxysmal w/ worsening frequency of episodes in the past week.  Recommend rhythm control w/ flecainide 50 mg q12hrs.  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  Cont.  daily.  Follow up in cardiology clinic in 1 week.  OK to d/c from cardiology standpoint w/ FU in 1 week in office.

## 2020-01-31 NOTE — ED PROVIDER NOTE - CARE PROVIDER_API CALL
Avel Alvarez (MD)  Cardiovascular Disease  [address] [place] [address]  Phone: [phone]  Fax: [phone]  Follow Up Time: 7-10 Days

## 2020-01-31 NOTE — CONSULT NOTE ADULT - SUBJECTIVE AND OBJECTIVE BOX
63 y/o w/ PAF, HTN, HLP presents to ED w/ palpitations.  ECG on arrrival showed NSR.  Pt states over past week has had multiple  episodes of palpitations, dyspnea consistent w/ afib episodes he   has had in the past.  H/o afib x years.  In past episodes infrequent, duration of episodes 10-20 minutes  by months.  In the past week,  have occurred multiple times a day 10-20 minutes duration.  Has to stop what he is doing when symptoms occur.  Outside of these episodes denies any cardiac symptoms:  no chest pain, dyspnea, palpitations, lightheadness syncope.    Seen 2 days ago in cardiology clinic, was in afib in 110s.  metoprolol succ 100 daily uptitrated to 150 mg daily.    Has remained in NSR for entire time during stay in ER.      Cardiac testing in clinic unremarkable as listed below:  EK2020, Afib   Stress Test: 2018, no Ischemia   Echo: 2018 LVEF 60%.     PAST MEDICAL & SURGICAL HISTORY:  Atrial fibrillation  Hypercholesteremia  Hypertension  History of arthroscopy of right knee  History of back surgery: L5,S1 -       SOCIAL HISTORY: neg x 3    FAMILY HISTORY:  No pertinent family history in first degree relatives    ALLERGIES:  Allergies    No Known Allergies  Intolerances    MEDICATIONS:    MEDICATIONS  (STANDING):    Aspirin 325 MG Oral Tablet; TAKE 1 TABLET DAILY  [facility] Vitamin B12 1000 MCG Oral Tablet; TAKE 1 TABLET DAILY AS DIRECTED  Lisinopril 10 MG Oral Tablet; TAKE 1 TABLET DAILY  Metoprolol Succinate  MG Oral Tablet Extended Release 24 Hour; TAKE 1 TABLET  DAILY  Simvastatin 40 MG Oral Tablet; Take 1 tablet daily    MEDICATIONS  (PRN):      REVIEW OF SYSTEMS:    CONSTITUTIONAL: No weakness, fevers or chills  EYES/ENT: No visual changes;  No vertigo or throat pain   NECK: No pain or stiffness  RESPIRATORY: No cough, wheezing, hemoptysis; No shortness of breath  CARDIOVASCULAR: No chest pain or palpitations  GASTROINTESTINAL: No abdominal or epigastric pain. No nausea, vomiting, or hematemesis; No diarrhea or constipation. No melena or hematochezia.  GENITOURINARY: No dysuria, frequency or hematuria  NEUROLOGICAL: No numbness or weakness  SKIN: No itching, burning, rashes, or lesions   All other review of systems is negative unless indicated above    Vital Signs Last 24 Hrs  T(C): 36.6 (2020 11:48), Max: 36.6 (2020 11:48)  T(F): 97.8 (2020 11:48), Max: 97.8 (2020 11:48)  HR: 67 (2020 14:39) (59 - 98)  BP: 130/71 (2020 14:39) (130/71 - 175/81)  BP(mean): --  RR: 13 (2020 14:39) (13 - 20)  SpO2: 99% (2020 14:39) (99% - 100%)Daily Height in cm: 177.8 (2020 11:48)    Daily I&O's Summary    2020 07:01  -  2020 15:26  --------------------------------------------------------  IN: 1000 mL / OUT: 0 mL / NET: 1000 mL        PHYSICAL EXAM:    Constitutional: NAD, awake and alert,   HEENT: PERR, EOMI,    Neck:  supple,  No JVD  Respiratory: Breath sounds are clear bilaterally, No wheezing, rales or rhonchi  Cardiovascular: S1 and S2, regular rate and rhythm, no Murmurs, gallops or rubs  Gastrointestinal: Bowel Sounds present, soft, nontender.   Extremities: No peripheral edema. No clubbing or cyanosis.  Vascular: 2+ peripheral pulses  Neurological: A/O x 3, no focal deficits  Musculoskeletal: no calf tenderness.  Skin: No rashes.      LABS: All Labs Reviewed:                        14.9   7.00  )-----------( 202      ( 2020 12:10 )             44.5     2020 12:10    142    |  105    |  15     ----------------------------<  104    4.1     |  29     |  1.00     Ca    9.2        2020 12:10  Mg     2.3       2020 12:10    TPro  7.0    /  Alb  3.8    /  TBili  1.1    /  DBili  x      /  AST  19     /  ALT  30     /  AlkPhos  53     2020 12:10      CARDIAC MARKERS ( 2020 12:10 )  .000 ng/mL / x     / x     / x     / x          RADIOLOGY/EKG:  NSR, normal QRS, normal QTc    ECHO/CARDIAC CATHTERIZATION/STRESS TEST:  see above.

## 2020-01-31 NOTE — ED PROVIDER NOTE - CARE PLAN
Principal Discharge DX:	Atrial fibrillation, unspecified type  Secondary Diagnosis:	Shortness of breath

## 2020-01-31 NOTE — ED ADULT NURSE NOTE - OBJECTIVE STATEMENT
Patient presents c/o rapid heartbeat on and off for 4 days, h/o afib. C/O shortness of breath with the rapid heartbeat. Denies CP. Patient saw cardiologist 2 days ago and was told to increase his metoprolol to 150MG daily but the symptoms persist. Patient uses a phone angle to measure his heartrate and at times had reading of 160-200 when feeling the rapid rhythm. Patient found to be in a normal sinus rhythm upon placement of monitor leads and states he felt his rhythm normalize when he arrived to the ED.

## 2020-01-31 NOTE — ED PROVIDER NOTE - PATIENT PORTAL LINK FT
You can access the FollowMyHealth Patient Portal offered by [facility] by registering at the following website: [email or web address]. By joining [facility]’s FollowMyHealth portal, you will also be able to view your health information using other applications (apps) compatible with our system.

## 2020-01-31 NOTE — ED PROVIDER NOTE - PROGRESS NOTE DETAILS
spoke with Dr. Carranza (cards). will see patient in ED in a couple hours. will continue to monitor in ED. patient informed of plan Dr. Carrnaza at bedside. patient has been monitored past few hours in ED and has remained in sinus rhythm Reevaluated patient at bedside.  no pain or complaints. Discussed the results of all diagnostic testing in ED and copies of all reports given.  Per Dr. Carranza, will start flecainide (was called in to pharmacy by him). will follow up in office next week.  An opportunity to ask questions was given.  Discussed the importance of prompt, close medical follow-up.  Patient will return with any changes, concerns or persistent / worsening symptoms.  Understanding of all instructions verbalized.

## 2020-02-17 ENCOUNTER — NON-APPOINTMENT (OUTPATIENT)
Age: 65
End: 2020-02-17

## 2020-02-17 ENCOUNTER — APPOINTMENT (OUTPATIENT)
Dept: CARDIOLOGY | Facility: CLINIC | Age: 65
End: 2020-02-17
Payer: COMMERCIAL

## 2020-02-17 VITALS — HEIGHT: 70 IN | WEIGHT: 180 LBS | BODY MASS INDEX: 25.77 KG/M2

## 2020-02-17 VITALS — HEART RATE: 66 BPM | DIASTOLIC BLOOD PRESSURE: 66 MMHG | OXYGEN SATURATION: 100 % | SYSTOLIC BLOOD PRESSURE: 105 MMHG

## 2020-02-17 PROCEDURE — 93000 ELECTROCARDIOGRAM COMPLETE: CPT

## 2020-02-17 PROCEDURE — 99214 OFFICE O/P EST MOD 30 MIN: CPT | Mod: 25

## 2020-02-18 NOTE — HISTORY OF PRESENT ILLNESS
[FreeTextEntry1] :   Presented today to office for follow up , currently in normal sinus rhythm. Tolerating increased dose of Metoprolol. Denies chest pain, shortness of breath, dyspnea on exertion, palpitations, orthopnea, paroxysmal nocturnal dyspnea, claudication, dizziness, lightheadedness, presyncopal or syncopal symptoms. \par History of Afib (No AC was taken off 2014 on Aspirin).

## 2020-02-18 NOTE — REASON FOR VISIT
[Atrial Fibrillation] : atrial fibrillation [Follow-Up - Clinic] : a clinic follow-up of [Hyperlipidemia] : hyperlipidemia [Hypertension] : hypertension [Medication Management] : Medication management

## 2020-02-18 NOTE — PHYSICAL EXAM
[General Appearance - Well Developed] : well developed [Normal Appearance] : normal appearance [Well Groomed] : well groomed [General Appearance - Well Nourished] : well nourished [No Deformities] : no deformities [General Appearance - In No Acute Distress] : no acute distress [Normal Conjunctiva] : the conjunctiva exhibited no abnormalities [Respiration, Rhythm And Depth] : normal respiratory rhythm and effort [] : no respiratory distress [Exaggerated Use Of Accessory Muscles For Inspiration] : no accessory muscle use [Auscultation Breath Sounds / Voice Sounds] : lungs were clear to auscultation bilaterally [Heart Rate And Rhythm] : heart rate and rhythm were normal [Heart Sounds] : normal S1 and S2 [Abdomen Soft] : soft [Abnormal Walk] : normal gait [Skin Color & Pigmentation] : normal skin color and pigmentation [Oriented To Time, Place, And Person] : oriented to person, place, and time [FreeTextEntry1] : No LE Edema

## 2020-02-18 NOTE — ASSESSMENT
[FreeTextEntry1] : Afib Currently Sinus rhythm Continue current medication regimen. Will arrange appointment with Dr Chan for next week.   \par . \par Follow after EP appointment.

## 2020-02-21 ENCOUNTER — NON-APPOINTMENT (OUTPATIENT)
Age: 65
End: 2020-02-21

## 2020-02-21 ENCOUNTER — APPOINTMENT (OUTPATIENT)
Dept: ELECTROPHYSIOLOGY | Facility: CLINIC | Age: 65
End: 2020-02-21
Payer: COMMERCIAL

## 2020-02-21 VITALS
HEART RATE: 66 BPM | BODY MASS INDEX: 25.48 KG/M2 | SYSTOLIC BLOOD PRESSURE: 126 MMHG | HEIGHT: 70 IN | OXYGEN SATURATION: 100 % | WEIGHT: 178 LBS | DIASTOLIC BLOOD PRESSURE: 80 MMHG

## 2020-02-21 DIAGNOSIS — J42 ACUTE BRONCHITIS, UNSPECIFIED: ICD-10-CM

## 2020-02-21 DIAGNOSIS — J20.9 ACUTE BRONCHITIS, UNSPECIFIED: ICD-10-CM

## 2020-02-21 PROCEDURE — 93000 ELECTROCARDIOGRAM COMPLETE: CPT

## 2020-02-21 PROCEDURE — 99205 OFFICE O/P NEW HI 60 MIN: CPT

## 2020-02-21 RX ORDER — ACETAMINOPHEN/DIPHENHYDRAMINE 500MG-25MG
1000 TABLET ORAL DAILY
Refills: 0 | Status: ACTIVE | COMMUNITY

## 2020-02-21 RX ORDER — LISINOPRIL 10 MG/1
10 TABLET ORAL DAILY
Qty: 30 | Refills: 1 | Status: ACTIVE | COMMUNITY

## 2020-02-21 NOTE — DISCUSSION/SUMMARY
[FreeTextEntry1] : 65 yo male with HTN and paroxysmal atrial fibrillation, known to have normal LV Function, LVEF 60%. \par Rhythm control strategy is beneficial for symptomatic AF, he is on Flecainide 50mg bid and toprolol xl 150mg daily\par -adviced pt to start ac Eliquis 5mg bid, chadsvasc 1 for htn, stop [id number] \par -discussed option for AF monitoring LINQ is reasonable option and pt prefers to have longterm cardiac monitoring for AF burden\par -if he has more recurrent episodes of AF he will be good candidate for catheter ablation procedure\par rtc in 4 months\par \par

## 2020-02-21 NOTE — PHYSICAL EXAM
[General Appearance - Well Developed] : well developed [Normal Appearance] : normal appearance [Well Groomed] : well groomed [General Appearance - Well Nourished] : well nourished [No Deformities] : no deformities [Normal Conjunctiva] : the conjunctiva exhibited no abnormalities [General Appearance - In No Acute Distress] : no acute distress [Heart Rate And Rhythm] : heart rate and rhythm were normal [Heart Sounds] : normal S1 and S2 [] : no respiratory distress [Respiration, Rhythm And Depth] : normal respiratory rhythm and effort [Exaggerated Use Of Accessory Muscles For Inspiration] : no accessory muscle use [Auscultation Breath Sounds / Voice Sounds] : lungs were clear to auscultation bilaterally [Abdomen Soft] : soft [Abnormal Walk] : normal gait [Oriented To Time, Place, And Person] : oriented to person, place, and time [Skin Color & Pigmentation] : normal skin color and pigmentation [FreeTextEntry1] : No LE Edema

## 2020-03-02 ENCOUNTER — OUTPATIENT (OUTPATIENT)
Dept: OUTPATIENT SERVICES | Facility: HOSPITAL | Age: 65
LOS: 1 days | Discharge: HOME | End: 2020-03-02
Payer: COMMERCIAL

## 2020-03-02 VITALS
DIASTOLIC BLOOD PRESSURE: 75 MMHG | HEART RATE: 62 BPM | RESPIRATION RATE: 16 BRPM | OXYGEN SATURATION: 100 % | TEMPERATURE: 97 F | SYSTOLIC BLOOD PRESSURE: 155 MMHG

## 2020-03-02 VITALS
DIASTOLIC BLOOD PRESSURE: 80 MMHG | RESPIRATION RATE: 18 BRPM | TEMPERATURE: 97 F | HEART RATE: 68 BPM | OXYGEN SATURATION: 100 % | SYSTOLIC BLOOD PRESSURE: 150 MMHG

## 2020-03-02 DIAGNOSIS — Z98.89 OTHER SPECIFIED POSTPROCEDURAL STATES: Chronic | ICD-10-CM

## 2020-03-02 DIAGNOSIS — Z98.890 OTHER SPECIFIED POSTPROCEDURAL STATES: Chronic | ICD-10-CM

## 2020-03-02 DIAGNOSIS — I48.91 UNSPECIFIED ATRIAL FIBRILLATION: ICD-10-CM

## 2020-03-02 PROCEDURE — C1764: CPT

## 2020-03-02 PROCEDURE — 33285 INSJ SUBQ CAR RHYTHM MNTR: CPT

## 2020-03-02 NOTE — PROCEDURE NOTE - GENERAL PROCEDURE DETAILS
successful implant of ILR at the left suprapectoral region in left parasternal area, hemostasis achieved, incision closed with 4.0 monocryl suture and dermabond

## 2020-03-02 NOTE — PROCEDURAL SAFETY CHECKLIST WITH OR WITHOUT SEDATION - NSREADY4TIMEOUT_GEN_ALL_CORE
Consult received. Full note to follow.    Please call for questions.    Thanks,  Kallie Queen MD  Infectious Disease Fellow, PGY-5  Pager: [phone] or ext: [phone]  [facility]       done

## 2020-03-02 NOTE — PACU DISCHARGE NOTE - COMMENTS
discharge instructions given to patient. Answered all questions. Verbalized understanding. All belongings are with the patient, including home monitor device. Left the unit with girlfriend at 1430

## 2020-03-03 DIAGNOSIS — R06.02 SHORTNESS OF BREATH: ICD-10-CM

## 2020-03-03 DIAGNOSIS — Z79.82 LONG TERM (CURRENT) USE OF ASPIRIN: ICD-10-CM

## 2020-03-03 DIAGNOSIS — E78.00 PURE HYPERCHOLESTEROLEMIA, UNSPECIFIED: ICD-10-CM

## 2020-03-03 DIAGNOSIS — I48.91 UNSPECIFIED ATRIAL FIBRILLATION: ICD-10-CM

## 2020-03-03 DIAGNOSIS — I10 ESSENTIAL (PRIMARY) HYPERTENSION: ICD-10-CM

## 2020-03-19 ENCOUNTER — APPOINTMENT (OUTPATIENT)
Dept: ELECTROPHYSIOLOGY | Facility: CLINIC | Age: 65
End: 2020-03-19

## 2020-04-02 ENCOUNTER — APPOINTMENT (OUTPATIENT)
Dept: ELECTROPHYSIOLOGY | Facility: CLINIC | Age: 65
End: 2020-04-02
Payer: COMMERCIAL

## 2020-04-02 PROCEDURE — G2066: CPT

## 2020-04-02 PROCEDURE — 93298 REM INTERROG DEV EVAL SCRMS: CPT

## 2020-04-06 PROBLEM — I73.00 RAYNAUD'S SYNDROME WITHOUT GANGRENE: Chronic | Status: ACTIVE | Noted: 2020-03-02

## 2020-04-27 ENCOUNTER — APPOINTMENT (OUTPATIENT)
Dept: ELECTROPHYSIOLOGY | Facility: CLINIC | Age: 65
End: 2020-04-27

## 2020-05-04 ENCOUNTER — APPOINTMENT (OUTPATIENT)
Dept: ELECTROPHYSIOLOGY | Facility: CLINIC | Age: 65
End: 2020-05-04
Payer: COMMERCIAL

## 2020-05-04 PROCEDURE — 93298 REM INTERROG DEV EVAL SCRMS: CPT

## 2020-05-04 PROCEDURE — G2066: CPT

## 2020-05-26 RX ORDER — CEFDINIR 300 MG/1
300 CAPSULE ORAL DAILY
Refills: 0 | Status: DISCONTINUED | COMMUNITY
Start: 2020-02-21 | End: 2020-05-26

## 2020-05-26 RX ORDER — BENZONATATE 100 MG/1
100 CAPSULE ORAL
Refills: 0 | Status: DISCONTINUED | COMMUNITY
Start: 2020-02-21 | End: 2020-05-26

## 2020-05-28 ENCOUNTER — APPOINTMENT (OUTPATIENT)
Dept: ELECTROPHYSIOLOGY | Facility: CLINIC | Age: 65
End: 2020-05-28
Payer: COMMERCIAL

## 2020-05-28 VITALS
SYSTOLIC BLOOD PRESSURE: 155 MMHG | BODY MASS INDEX: 24.91 KG/M2 | DIASTOLIC BLOOD PRESSURE: 83 MMHG | HEART RATE: 60 BPM | WEIGHT: 174 LBS | HEIGHT: 70 IN | OXYGEN SATURATION: 100 %

## 2020-05-28 PROCEDURE — 99215 OFFICE O/P EST HI 40 MIN: CPT

## 2020-05-28 PROCEDURE — 93291 INTERROG DEV EVAL SCRMS IP: CPT

## 2020-05-28 NOTE — HISTORY OF PRESENT ILLNESS
[FreeTextEntry1] :  65 yo male with HTN and paroxysmal atrial fibrillation initially dx in 2015-16, known to have normal LV Function, LVEF 60%. Patient has occasional bothersome palpitations from afib despite being on toprolol xl 150mg. Palpitations are more severe and prolonged, also more frequent. Last episode was in 1/28/20 with RVR to 200 bpm. He is on Flecainide 50mg bid. \par s/p ILR implant that shows AF episode [id number] RVR to 154 bpm, he feels palpitations and sob during AF\par chadsvasc 1 (HTN), not on AC but on A/\par MPI 8/2018 shows no evidence of ischemia

## 2020-05-28 NOTE — DISCUSSION/SUMMARY
[FreeTextEntry1] : 64 yo male with HTN and paroxysmal atrial fibrillation, known to have normal LV Function, LVEF 60%. \par Rhythm control strategy is beneficial for symptomatic AF, he is on Flecainide 50mg bid and toprolol xl 150mg daily\par -compliance with Eliquis 5mg bid, chadsvasc 1 for htn \par -discussed rhythm control treatments increase flecainide or ablation prcoedure as more effective therapy, pt would like to wait and see if symptoms are more progressive \par rtc in 4 months\par \par

## 2020-05-28 NOTE — REASON FOR VISIT
[Follow-Up - Clinic] : a clinic follow-up of [Atrial Fibrillation] : atrial fibrillation [FreeTextEntry1] : Dr Moreno

## 2020-05-28 NOTE — PHYSICAL EXAM
[General Appearance - Well Developed] : well developed [Normal Appearance] : normal appearance [Well Groomed] : well groomed [General Appearance - Well Nourished] : well nourished [No Deformities] : no deformities [General Appearance - In No Acute Distress] : no acute distress [Normal Conjunctiva] : the conjunctiva exhibited no abnormalities [] : no respiratory distress [Respiration, Rhythm And Depth] : normal respiratory rhythm and effort [Auscultation Breath Sounds / Voice Sounds] : lungs were clear to auscultation bilaterally [Exaggerated Use Of Accessory Muscles For Inspiration] : no accessory muscle use [Heart Rate And Rhythm] : heart rate and rhythm were normal [Abdomen Soft] : soft [Heart Sounds] : normal S1 and S2 [Abnormal Walk] : normal gait [Skin Color & Pigmentation] : normal skin color and pigmentation [FreeTextEntry1] : No LE Edema  [Oriented To Time, Place, And Person] : oriented to person, place, and time

## 2020-06-20 NOTE — ED ADULT NURSE NOTE - CHPI ED NUR AGGRAVATING FX
Price (Do Not Change): 0.00 Instructions: This plan will send the code FBSD to the PM system.  DO NOT or CHANGE the price. Detail Level: Simple none

## 2020-06-29 ENCOUNTER — APPOINTMENT (OUTPATIENT)
Dept: ELECTROPHYSIOLOGY | Facility: CLINIC | Age: 65
End: 2020-06-29
Payer: COMMERCIAL

## 2020-06-29 PROCEDURE — G2066: CPT

## 2020-06-29 PROCEDURE — 93298 REM INTERROG DEV EVAL SCRMS: CPT

## 2020-08-02 ENCOUNTER — APPOINTMENT (OUTPATIENT)
Dept: ELECTROPHYSIOLOGY | Facility: CLINIC | Age: 65
End: 2020-08-02
Payer: COMMERCIAL

## 2020-08-03 PROCEDURE — G2066: CPT

## 2020-08-03 PROCEDURE — 93298 REM INTERROG DEV EVAL SCRMS: CPT

## 2020-09-08 ENCOUNTER — APPOINTMENT (OUTPATIENT)
Dept: ELECTROPHYSIOLOGY | Facility: CLINIC | Age: 65
End: 2020-09-08
Payer: COMMERCIAL

## 2020-09-09 PROCEDURE — G2066: CPT

## 2020-09-09 PROCEDURE — 93298 REM INTERROG DEV EVAL SCRMS: CPT

## 2020-09-24 ENCOUNTER — APPOINTMENT (OUTPATIENT)
Dept: ELECTROPHYSIOLOGY | Facility: CLINIC | Age: 65
End: 2020-09-24

## 2020-09-24 ENCOUNTER — RECORD ABSTRACTING (OUTPATIENT)
Age: 65
End: 2020-09-24

## 2020-09-29 NOTE — DISCUSSION/SUMMARY
[FreeTextEntry1] : 64 yo male with HTN and paroxysmal atrial fibrillation, known to have normal LV Function, LVEF 60%. \par Rhythm control strategy is beneficial for symptomatic AF, he is on Flecainide 50mg bid and toprolol xl 150mg daily\par -compliance with Eliquis 5mg bid, chadsvasc 1 for htn \par \par \par

## 2020-09-29 NOTE — HISTORY OF PRESENT ILLNESS
[FreeTextEntry1] :  66 yo male with HTN and paroxysmal atrial fibrillation initially dx in 2015-16, known to have normal LV Function, LVEF 60%. Patient has occasional bothersome palpitations from afib despite being on toprolol xl 150mg. Palpitations are more severe and prolonged, also more frequent. Last episode was in 1/28/20 with RVR to 200 bpm. He is on Flecainide 50mg bid. \par s/p ILR implant that shows AF episode [id number] RVR to 154 bpm, he feels palpitations and sob during AF\par chadsvasc 1 (HTN), not on AC but on A/\par MPI 8/2018 shows no evidence of ischemia

## 2020-10-12 ENCOUNTER — APPOINTMENT (OUTPATIENT)
Dept: ELECTROPHYSIOLOGY | Facility: CLINIC | Age: 65
End: 2020-10-12
Payer: COMMERCIAL

## 2020-10-12 PROCEDURE — G2066: CPT

## 2020-10-12 PROCEDURE — 93298 REM INTERROG DEV EVAL SCRMS: CPT

## 2020-11-16 ENCOUNTER — APPOINTMENT (OUTPATIENT)
Dept: ELECTROPHYSIOLOGY | Facility: CLINIC | Age: 65
End: 2020-11-16
Payer: COMMERCIAL

## 2020-11-16 PROCEDURE — G2066: CPT

## 2020-11-16 PROCEDURE — 93298 REM INTERROG DEV EVAL SCRMS: CPT

## 2020-12-07 ENCOUNTER — APPOINTMENT (OUTPATIENT)
Dept: ELECTROPHYSIOLOGY | Facility: CLINIC | Age: 65
End: 2020-12-07

## 2020-12-07 NOTE — PHYSICAL EXAM
[Normal Appearance] : normal appearance [] : no respiratory distress [Respiration, Rhythm And Depth] : normal respiratory rhythm and effort [Heart Rate And Rhythm] : heart rate and rhythm were normal [Heart Sounds] : normal S1 and S2 [Bowel Sounds] : normal bowel sounds [Abnormal Walk] : normal gait [Skin Color & Pigmentation] : normal skin color and pigmentation [Oriented To Time, Place, And Person] : oriented to person, place, and time [Impaired Insight] : insight and judgment were intact

## 2020-12-07 NOTE — HISTORY OF PRESENT ILLNESS
[FreeTextEntry1] :  66 yo male with HTN and paroxysmal atrial fibrillation initially dx in 2015-16, known to have normal LV Function, LVEF 60%. Patient has occasional bothersome palpitations from afib despite being on toprolol xl 150mg. Palpitations are more severe and prolonged, also more frequent. Last episode was in 1/28/20 with RVR to 200 bpm. He is on Flecainide 50mg bid. \par s/p ILR implant that shows AF episode 5x23pib RVR to 154 bpm, he feels palpitations and sob during AF\par chadsvasc 1 (HTN), not on AC but on A/\par MPI 8/2018 shows no evidence of ischemia

## 2020-12-20 ENCOUNTER — APPOINTMENT (OUTPATIENT)
Dept: ELECTROPHYSIOLOGY | Facility: CLINIC | Age: 65
End: 2020-12-20
Payer: COMMERCIAL

## 2020-12-21 PROCEDURE — G2066: CPT

## 2020-12-21 PROCEDURE — 93298 REM INTERROG DEV EVAL SCRMS: CPT

## 2021-01-21 ENCOUNTER — APPOINTMENT (OUTPATIENT)
Dept: ELECTROPHYSIOLOGY | Facility: CLINIC | Age: 66
End: 2021-01-21
Payer: COMMERCIAL

## 2021-01-21 PROCEDURE — G2066: CPT

## 2021-01-21 PROCEDURE — 93298 REM INTERROG DEV EVAL SCRMS: CPT

## 2021-01-26 NOTE — HISTORY OF PRESENT ILLNESS
[FreeTextEntry1] :  63 yo male with HTN and paroxysmal atrial fibrillation initially dx in 2015-16, known to have normal LV Function, LVEF 60%. Patient has occasional bothersome palpitations from afib despite being on toprolol xl 150mg. Palpitations are more severe and prolonged, also more frequent. Last episode was in 1/28/20 with RVR to 200 bpm. He is on Flecainide 50mg bid. \par Denies chest pain, shortness of breath, dyspnea on exertion, dizziness, syncopal symptoms. \par chadsvasc 1 (HTN), not on AC but on [id number]\par MPI 8/2018 shows no evidence of ischemia
[FreeTextEntry6] : Nasal Endoscopy (43332)\par \par After informed verbal consent, nasal endoscopy was performed due to anterior rhinoscopy insufficient to account for symptoms.  [ Flexible ][ Rigid ], scope # [] was used.  Topical vasoconstrictor and anesthetic was used.  The exam showed a deviated septum to [ right ][ left ][ midline ]. \par \par The nasal endoscope is passed via the right nasal cavity. The inferior, middle, and superior turbinates responded to vasoconstrictors. The inferior, middle and superior meati were examined. The osteomeatal complex is clear with no polyposis or purulence. The sphenoethmoidal recess is clear with no polyposis or purulence. The choana is patent. \par \par The nasal endoscope is passed via the left nasal cavity. The inferior, middle, and superior turbinates responded to vasoconstrictors. The inferior, middle and superior meati were examined. The osteomeatal complex is clear with no polyposis or purulence. The sphenoethmoidal recess is clear with no polyposis or purulence. The choana is patent.  \par \par There is no obstruction of the nasopharynx with adenoid tissue.\par \par

## 2021-02-05 ENCOUNTER — NON-APPOINTMENT (OUTPATIENT)
Age: 66
End: 2021-02-05

## 2021-02-05 ENCOUNTER — APPOINTMENT (OUTPATIENT)
Dept: CARDIOLOGY | Facility: CLINIC | Age: 66
End: 2021-02-05
Payer: COMMERCIAL

## 2021-02-05 VITALS
OXYGEN SATURATION: 99 % | TEMPERATURE: 97.8 F | HEART RATE: 72 BPM | WEIGHT: 191 LBS | DIASTOLIC BLOOD PRESSURE: 60 MMHG | SYSTOLIC BLOOD PRESSURE: 110 MMHG | HEIGHT: 70 IN | BODY MASS INDEX: 27.35 KG/M2

## 2021-02-05 PROCEDURE — 93000 ELECTROCARDIOGRAM COMPLETE: CPT

## 2021-02-05 PROCEDURE — 99072 ADDL SUPL MATRL&STAF TM PHE: CPT

## 2021-02-05 PROCEDURE — 99214 OFFICE O/P EST MOD 30 MIN: CPT

## 2021-02-05 RX ORDER — MOMETASONE FUROATE AND FORMOTEROL FUMARATE DIHYDRATE 100; 5 UG/1; UG/1
100-5 AEROSOL RESPIRATORY (INHALATION)
Refills: 0 | Status: DISCONTINUED | COMMUNITY
Start: 2020-02-21 | End: 2021-02-05

## 2021-02-05 NOTE — HISTORY OF PRESENT ILLNESS
[FreeTextEntry1] :   Presented today to office for follow up , currently in normal sinus rhythm. Taking flecinide and has had no reported episodes of atrial fibrillation lately according to EP reports. Pt denies chest pain or shortness of breath. EP notes reviewed.

## 2021-02-05 NOTE — ASSESSMENT
[FreeTextEntry1] : Afib Currently Sinus rhythm Continue current medication regimen. Pt will consider ablation for PAF. He will follow up with Dr. Lopez\par Pt is planning to have an epidural. There are no cardiac contraindications for planned epidural. He may stop DOAC for 3 days prior to the procedure.

## 2021-02-24 ENCOUNTER — APPOINTMENT (OUTPATIENT)
Dept: ELECTROPHYSIOLOGY | Facility: CLINIC | Age: 66
End: 2021-02-24
Payer: COMMERCIAL

## 2021-02-25 PROCEDURE — 93298 REM INTERROG DEV EVAL SCRMS: CPT

## 2021-02-25 PROCEDURE — G2066: CPT

## 2021-03-02 ENCOUNTER — NON-APPOINTMENT (OUTPATIENT)
Age: 66
End: 2021-03-02

## 2021-03-31 ENCOUNTER — APPOINTMENT (OUTPATIENT)
Dept: ELECTROPHYSIOLOGY | Facility: CLINIC | Age: 66
End: 2021-03-31
Payer: COMMERCIAL

## 2021-04-01 ENCOUNTER — NON-APPOINTMENT (OUTPATIENT)
Age: 66
End: 2021-04-01

## 2021-04-01 PROCEDURE — 93298 REM INTERROG DEV EVAL SCRMS: CPT

## 2021-04-01 PROCEDURE — G2066: CPT

## 2021-04-05 ENCOUNTER — APPOINTMENT (OUTPATIENT)
Dept: NEUROSURGERY | Facility: CLINIC | Age: 66
End: 2021-04-05
Payer: COMMERCIAL

## 2021-04-05 VITALS
SYSTOLIC BLOOD PRESSURE: 151 MMHG | DIASTOLIC BLOOD PRESSURE: 83 MMHG | TEMPERATURE: 98.1 F | WEIGHT: 180 LBS | OXYGEN SATURATION: 100 % | HEART RATE: 73 BPM | BODY MASS INDEX: 25.77 KG/M2 | HEIGHT: 70 IN

## 2021-04-05 PROCEDURE — 99072 ADDL SUPL MATRL&STAF TM PHE: CPT

## 2021-04-05 PROCEDURE — 99203 OFFICE O/P NEW LOW 30 MIN: CPT

## 2021-04-06 RX ORDER — CHROMIUM 200 MCG
1000 TABLET ORAL
Refills: 0 | Status: ACTIVE | COMMUNITY

## 2021-04-06 RX ORDER — ROSUVASTATIN CALCIUM 20 MG/1
20 TABLET, FILM COATED ORAL
Qty: 90 | Refills: 0 | Status: ACTIVE | COMMUNITY
Start: 2021-02-22

## 2021-04-08 NOTE — ASU PATIENT PROFILE, ADULT - NSALCOHOLPROBLEMSRELYN_GEN_A_CORE_SD
Constitutional: No fever or chills.   Skin: See HPI  Except as documented in the HPI, all other systems are negative.
no

## 2021-04-16 ENCOUNTER — NON-APPOINTMENT (OUTPATIENT)
Age: 66
End: 2021-04-16

## 2021-04-25 NOTE — CONSULT LETTER
[Dear  ___] : Dear  [unfilled], [Courtesy Letter:] : I had the pleasure of seeing your patient, [unfilled], in my office today. [Sincerely,] : Sincerely, [FreeTextEntry2] : Leonardo Frankel, DO\par [address]\[place] [address] [FreeTextEntry1] : This very pleasant 66-year-old right-handed gentleman who is otherwise well presents with longstanding history of lower back pain with now progressive radiating symptoms down the right greater than left leg.  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  There is no specific onset event.  The patient did have previous hemilaminectomy for an L5S1 disc herniation in the 1990s.  The patient indicates strong intense middle back pain at the level of the iliac crests.  He has difficulties with walking or standing for prolonged periods of time and indicates he will get some numbness and weakness in the legs.  In addition with prolonged activity he has typical sciatica pain running through the right buttock and down the foot.  Bowel and bladder function have remained normal.  The patient has undergone epidural steroid injections in the past but none recently because of side effects.  The patient has been seen by PT and he does physical therapy stretches and exercises on a consistent basis.\par \par I have reviewed with the patient his MRI scan of the lumbar spine performed at [facility] radiology on October 16, 2020.  These images clearly show extensive degenerative spondylotic changes.  Of particular importance at the L3-4 and L4-5 level there is significant stenosis with ligament hypertrophy and facet arthropathy in addition to degenerative disc which is contributing to significant central stenosis as well as foraminal stenosis.  At L4-5 there is a synovial cyst which is causing more deflection of the right exiting nerve root.  There is a slight spondylolisthesis at L4-5.  I have also reviewed with the patient his dynamic standing flexion-extension x-rays which were performed at [facility] these images show a very minimal but observable movement at the L4-5 level.\par \par On examination the patient is clearly in discomfort with his lower back.  There is pain with range of motion which is more prominently muscular in nature.  Straight leg raise is positive on the right-hand side at approximately 30 to 40 degrees of elevation.  Flexion and external rotation of the hip joints is negative with respect to capitulated and the patient's symptoms.  The patella reflexes are symmetric and normal.  The right and left ankle reflexes are decreased compared to the patellar reflexes and the right ankle reflex is less than the left.  Is no clonus.  The patient does have some weakness of dorsiflexion on the right-hand side.  Plantar flexion and knee extension is otherwise intact.\par \par The patient has undergone appropriate conservative measures including physical therapy as well as epidural steroid injections.  The patient's symptoms correlate well with significant spondylotic changes causing central stenosis.  In addition the synovial cyst seen at L4-5 on the right-hand side is consistent with the patient's right sciatica.  The patient is appropriate for decompression laminectomy to decompress the L3-4 and L4-5 levels as well as to deal with the right-handed synovial cyst.  I have discussed with the patient the possibility of needing a fusion because of the mild degree of dynamic movement seen on the x-ray.  However the patient indicates that he is thinking about retiring from his current line of work and would like to avoid diffusion procedure if possible.  I have therefore used surgical models as well as the patient's own imaging to discuss an L4 laminectomy and possible partial L3 laminectomy and L5 laminectomy in order to achieve good decompression across L3-4 and L4-5 with foraminotomies being performed bilaterally.  I have discussed the potential risks and complications of this surgery including those of general anesthesia, the creation of further instability requiring fusion at a later date, cerebrospinal fluid leak, persistent or new neurologic symptoms, infection, and postoperative pain and recovery expectations.  At the end of our discussion the patient indicated good understanding and that all questions had been addressed.  The patient has provided consent to move forward with surgery as soon as he can find a gap in his current workload and after he is achieved cardiac clearance.  The patient currently has an implanted loop recorder.\par \par Thank you for very kindly including me in the evaluation and support of your patient.  Please do not hesitate to contact me should you have any questions or concerns regarding this evaluation, patient's diagnosis, or the recommendation for surgical decompression surgery with laminectomy in the lumbar spine. [FreeTextEntry3] : Riley Benoit MD, PhD, FRCPSC\par  of Neurosurgery\par [facility]\par  of Neurosurgery\par [facility]\par [address] [place] [address], [address] Floor\[place] [address]\par Tel: [phone]\par FAX: [phone]\par

## 2021-04-25 NOTE — REASON FOR VISIT
[New Patient Visit] : a new patient visit [FreeTextEntry1] : lower back pain with right sciatica and neurogenic claudication

## 2021-04-29 ENCOUNTER — RESULT REVIEW (OUTPATIENT)
Age: 66
End: 2021-04-29

## 2021-04-29 ENCOUNTER — OUTPATIENT (OUTPATIENT)
Dept: OUTPATIENT SERVICES | Facility: HOSPITAL | Age: 66
LOS: 1 days | End: 2021-04-29
Payer: COMMERCIAL

## 2021-04-29 VITALS
HEART RATE: 67 BPM | WEIGHT: 179.9 LBS | OXYGEN SATURATION: 98 % | SYSTOLIC BLOOD PRESSURE: 149 MMHG | RESPIRATION RATE: 18 BRPM | HEIGHT: 70 IN | DIASTOLIC BLOOD PRESSURE: 76 MMHG | TEMPERATURE: 98 F

## 2021-04-29 DIAGNOSIS — M43.17 SPONDYLOLISTHESIS, LUMBOSACRAL REGION: ICD-10-CM

## 2021-04-29 DIAGNOSIS — Z98.89 OTHER SPECIFIED POSTPROCEDURAL STATES: Chronic | ICD-10-CM

## 2021-04-29 DIAGNOSIS — Z98.890 OTHER SPECIFIED POSTPROCEDURAL STATES: Chronic | ICD-10-CM

## 2021-04-29 DIAGNOSIS — M47.816 SPONDYLOSIS WITHOUT MYELOPATHY OR RADICULOPATHY, LUMBAR REGION: ICD-10-CM

## 2021-04-29 DIAGNOSIS — Z01.818 ENCOUNTER FOR OTHER PREPROCEDURAL EXAMINATION: ICD-10-CM

## 2021-04-29 LAB
A1C WITH ESTIMATED AVERAGE GLUCOSE RESULT: 5.1 % — SIGNIFICANT CHANGE UP (ref 4–5.6)
ALBUMIN SERPL ELPH-MCNC: 3.9 G/DL — SIGNIFICANT CHANGE UP (ref 3.3–5)
ANION GAP SERPL CALC-SCNC: 3 MMOL/L — LOW (ref 5–17)
APPEARANCE UR: CLEAR — SIGNIFICANT CHANGE UP
APTT BLD: 35.3 SEC — SIGNIFICANT CHANGE UP (ref 27.5–35.5)
BASOPHILS # BLD AUTO: 0.05 K/UL — SIGNIFICANT CHANGE UP (ref 0–0.2)
BASOPHILS NFR BLD AUTO: 0.6 % — SIGNIFICANT CHANGE UP (ref 0–2)
BILIRUB UR-MCNC: NEGATIVE — SIGNIFICANT CHANGE UP
BUN SERPL-MCNC: 17 MG/DL — SIGNIFICANT CHANGE UP (ref 7–23)
CALCIUM SERPL-MCNC: 9.6 MG/DL — SIGNIFICANT CHANGE UP (ref 8.5–10.1)
CHLORIDE SERPL-SCNC: 105 MMOL/L — SIGNIFICANT CHANGE UP (ref 96–108)
CO2 SERPL-SCNC: 30 MMOL/L — SIGNIFICANT CHANGE UP (ref 22–31)
COLOR SPEC: YELLOW — SIGNIFICANT CHANGE UP
CREAT SERPL-MCNC: 1.31 MG/DL — HIGH (ref 0.5–1.3)
DIFF PNL FLD: NEGATIVE — SIGNIFICANT CHANGE UP
EOSINOPHIL # BLD AUTO: 0.18 K/UL — SIGNIFICANT CHANGE UP (ref 0–0.5)
EOSINOPHIL NFR BLD AUTO: 2.1 % — SIGNIFICANT CHANGE UP (ref 0–6)
ESTIMATED AVERAGE GLUCOSE: 100 MG/DL — SIGNIFICANT CHANGE UP (ref 68–114)
GLUCOSE SERPL-MCNC: 103 MG/DL — HIGH (ref 70–99)
GLUCOSE UR QL: NEGATIVE MG/DL — SIGNIFICANT CHANGE UP
HCT VFR BLD CALC: 44.4 % — SIGNIFICANT CHANGE UP (ref 39–50)
HGB BLD-MCNC: 14.9 G/DL — SIGNIFICANT CHANGE UP (ref 13–17)
IMM GRANULOCYTES NFR BLD AUTO: 0.2 % — SIGNIFICANT CHANGE UP (ref 0–1.5)
INR BLD: 1.25 RATIO — HIGH (ref 0.88–1.16)
KETONES UR-MCNC: NEGATIVE — SIGNIFICANT CHANGE UP
LEUKOCYTE ESTERASE UR-ACNC: NEGATIVE — SIGNIFICANT CHANGE UP
LYMPHOCYTES # BLD AUTO: 1.93 K/UL — SIGNIFICANT CHANGE UP (ref 1–3.3)
LYMPHOCYTES # BLD AUTO: 22.4 % — SIGNIFICANT CHANGE UP (ref 13–44)
MCHC RBC-ENTMCNC: 31.8 PG — SIGNIFICANT CHANGE UP (ref 27–34)
MCHC RBC-ENTMCNC: 33.6 GM/DL — SIGNIFICANT CHANGE UP (ref 32–36)
MCV RBC AUTO: 94.7 FL — SIGNIFICANT CHANGE UP (ref 80–100)
MONOCYTES # BLD AUTO: 0.71 K/UL — SIGNIFICANT CHANGE UP (ref 0–0.9)
MONOCYTES NFR BLD AUTO: 8.2 % — SIGNIFICANT CHANGE UP (ref 2–14)
NEUTROPHILS # BLD AUTO: 5.74 K/UL — SIGNIFICANT CHANGE UP (ref 1.8–7.4)
NEUTROPHILS NFR BLD AUTO: 66.5 % — SIGNIFICANT CHANGE UP (ref 43–77)
NITRITE UR-MCNC: NEGATIVE — SIGNIFICANT CHANGE UP
PH UR: 6 — SIGNIFICANT CHANGE UP (ref 5–8)
PLATELET # BLD AUTO: 220 K/UL — SIGNIFICANT CHANGE UP (ref 150–400)
POTASSIUM SERPL-MCNC: 4.4 MMOL/L — SIGNIFICANT CHANGE UP (ref 3.5–5.3)
POTASSIUM SERPL-SCNC: 4.4 MMOL/L — SIGNIFICANT CHANGE UP (ref 3.5–5.3)
PROT UR-MCNC: NEGATIVE MG/DL — SIGNIFICANT CHANGE UP
PROTHROM AB SERPL-ACNC: 14.5 SEC — HIGH (ref 10.6–13.6)
RBC # BLD: 4.69 M/UL — SIGNIFICANT CHANGE UP (ref 4.2–5.8)
RBC # FLD: 11.9 % — SIGNIFICANT CHANGE UP (ref 10.3–14.5)
SODIUM SERPL-SCNC: 138 MMOL/L — SIGNIFICANT CHANGE UP (ref 135–145)
SP GR SPEC: 1.01 — SIGNIFICANT CHANGE UP (ref 1.01–1.02)
UROBILINOGEN FLD QL: 1 MG/DL
WBC # BLD: 8.63 K/UL — SIGNIFICANT CHANGE UP (ref 3.8–10.5)
WBC # FLD AUTO: 8.63 K/UL — SIGNIFICANT CHANGE UP (ref 3.8–10.5)

## 2021-04-29 PROCEDURE — 83036 HEMOGLOBIN GLYCOSYLATED A1C: CPT

## 2021-04-29 PROCEDURE — 86901 BLOOD TYPING SEROLOGIC RH(D): CPT

## 2021-04-29 PROCEDURE — 80048 BASIC METABOLIC PNL TOTAL CA: CPT

## 2021-04-29 PROCEDURE — 85025 COMPLETE CBC W/AUTO DIFF WBC: CPT

## 2021-04-29 PROCEDURE — 86850 RBC ANTIBODY SCREEN: CPT

## 2021-04-29 PROCEDURE — 85610 PROTHROMBIN TIME: CPT

## 2021-04-29 PROCEDURE — 71046 X-RAY EXAM CHEST 2 VIEWS: CPT

## 2021-04-29 PROCEDURE — G0463: CPT | Mod: 25

## 2021-04-29 PROCEDURE — 81003 URINALYSIS AUTO W/O SCOPE: CPT

## 2021-04-29 PROCEDURE — 82040 ASSAY OF SERUM ALBUMIN: CPT

## 2021-04-29 PROCEDURE — 71046 X-RAY EXAM CHEST 2 VIEWS: CPT | Mod: 26

## 2021-04-29 PROCEDURE — 87641 MR-STAPH DNA AMP PROBE: CPT

## 2021-04-29 PROCEDURE — 87640 STAPH A DNA AMP PROBE: CPT

## 2021-04-29 PROCEDURE — 36415 COLL VENOUS BLD VENIPUNCTURE: CPT

## 2021-04-29 PROCEDURE — 85730 THROMBOPLASTIN TIME PARTIAL: CPT

## 2021-04-29 PROCEDURE — 86900 BLOOD TYPING SEROLOGIC ABO: CPT

## 2021-04-29 RX ORDER — ASPIRIN/CALCIUM CARB/MAGNESIUM 324 MG
1 TABLET ORAL
Qty: 0 | Refills: 0 | DISCHARGE

## 2021-04-29 RX ORDER — SIMVASTATIN 20 MG/1
1 TABLET, FILM COATED ORAL
Qty: 0 | Refills: 0 | DISCHARGE

## 2021-04-29 RX ORDER — APIXABAN 2.5 MG/1
0 TABLET, FILM COATED ORAL
Qty: 0 | Refills: 0 | DISCHARGE

## 2021-04-29 NOTE — H&P PST ADULT - NSANTHOSAYNRD_GEN_A_CORE
No. SIL screening performed.  STOP BANG Legend: 0-2 = LOW Risk; 3-4 = INTERMEDIATE Risk; 5-8 = HIGH Risk

## 2021-04-29 NOTE — H&P PST ADULT - ASSESSMENT
66 year old man presents to [facility] for preprocedure exam. Patient is for planned L4 total laminectomy with L3/4 and L4/5 foraminal decompression with Dr Benoit on 5/11.        Plan:  - PST instructions given ; NPO status instructions to be given by ASU .  - Pt instructed to take following meds with sip of water : metorpolol, fleicanide, lisinopril   - Eliquis as per cardiologist recommendations - he is seeing Dr Alvarez on tuesday next week   - Pt instructed to take routine evening medications unless indicated .  -  Stop NSAIDS ( Aspirin Alev Motrin Mobic Diclofenac), herbal supplements , MVI , Vitamin fish oil 7 days prior to surgery  unless   directed by surgeon or cardiologist;   - Medical Optimization  with Dr Alvarez  - EZ wash instructions given & mupirocin instructions given. Instructed to use Mupirocin on ly if he gets a phone call from PST staff   - Labs EKG CXR as per surgeon request.   -  Pt instructed to self quarantine .  - Covid Testing scheduled

## 2021-04-29 NOTE — H&P PST ADULT - HISTORY OF PRESENT ILLNESS
66 year old man presents to [facility] for preprocedure exam. Patient is for planned L4 total laminectomy with L3/4 and L4/5 foraminal decompression with Dr Benoit on 5/11. Patient with complaints of back pain no radiation, worse with activity, he does not take anything for pain.

## 2021-04-29 NOTE — H&P PST ADULT - NSICDXPASTMEDICALHX_GEN_ALL_CORE_FT
PAST MEDICAL HISTORY:  Atrial fibrillation     Hypercholesteremia     Hypertension     Lumbar stenosis     Raynaud's disease without gangrene

## 2021-04-29 NOTE — H&P PST ADULT - NSICDXPASTSURGICALHX_GEN_ALL_CORE_FT
PAST SURGICAL HISTORY:  History of arthroscopy of right knee     History of back surgery L5,S1 - 1990

## 2021-04-30 DIAGNOSIS — Z01.818 ENCOUNTER FOR OTHER PREPROCEDURAL EXAMINATION: ICD-10-CM

## 2021-04-30 DIAGNOSIS — M43.17 SPONDYLOLISTHESIS, LUMBOSACRAL REGION: ICD-10-CM

## 2021-04-30 PROBLEM — M48.061 SPINAL STENOSIS, LUMBAR REGION WITHOUT NEUROGENIC CLAUDICATION: Chronic | Status: ACTIVE | Noted: 2021-04-29

## 2021-04-30 LAB
MRSA PCR RESULT.: SIGNIFICANT CHANGE UP
S AUREUS DNA NOSE QL NAA+PROBE: SIGNIFICANT CHANGE UP

## 2021-05-03 ENCOUNTER — APPOINTMENT (OUTPATIENT)
Dept: ELECTROPHYSIOLOGY | Facility: CLINIC | Age: 66
End: 2021-05-03
Payer: COMMERCIAL

## 2021-05-03 ENCOUNTER — NON-APPOINTMENT (OUTPATIENT)
Age: 66
End: 2021-05-03

## 2021-05-03 PROCEDURE — 93298 REM INTERROG DEV EVAL SCRMS: CPT

## 2021-05-03 PROCEDURE — G2066: CPT

## 2021-05-04 ENCOUNTER — APPOINTMENT (OUTPATIENT)
Dept: CARDIOLOGY | Facility: CLINIC | Age: 66
End: 2021-05-04
Payer: COMMERCIAL

## 2021-05-04 ENCOUNTER — NON-APPOINTMENT (OUTPATIENT)
Age: 66
End: 2021-05-04

## 2021-05-04 VITALS
SYSTOLIC BLOOD PRESSURE: 145 MMHG | HEART RATE: 63 BPM | TEMPERATURE: 98.4 F | BODY MASS INDEX: 25.77 KG/M2 | HEIGHT: 70 IN | WEIGHT: 180 LBS | DIASTOLIC BLOOD PRESSURE: 84 MMHG | OXYGEN SATURATION: 99 %

## 2021-05-04 VITALS — DIASTOLIC BLOOD PRESSURE: 76 MMHG | SYSTOLIC BLOOD PRESSURE: 128 MMHG

## 2021-05-04 DIAGNOSIS — Z01.810 ENCOUNTER FOR PREPROCEDURAL CARDIOVASCULAR EXAMINATION: ICD-10-CM

## 2021-05-04 DIAGNOSIS — I48.91 UNSPECIFIED ATRIAL FIBRILLATION: ICD-10-CM

## 2021-05-04 PROCEDURE — 93000 ELECTROCARDIOGRAM COMPLETE: CPT | Mod: NC

## 2021-05-04 PROCEDURE — 99072 ADDL SUPL MATRL&STAF TM PHE: CPT

## 2021-05-04 PROCEDURE — 99214 OFFICE O/P EST MOD 30 MIN: CPT

## 2021-05-04 RX ORDER — GABAPENTIN 300 MG/1
300 CAPSULE ORAL
Qty: 30 | Refills: 0 | Status: DISCONTINUED | COMMUNITY
Start: 2020-10-20 | End: 2021-05-04

## 2021-05-04 RX ORDER — TIZANIDINE 2 MG/1
2 TABLET ORAL
Qty: 30 | Refills: 0 | Status: DISCONTINUED | COMMUNITY
Start: 2020-10-20 | End: 2021-05-04

## 2021-05-04 RX ORDER — METOPROLOL SUCCINATE 50 MG/1
50 TABLET, EXTENDED RELEASE ORAL
Qty: 90 | Refills: 0 | Status: DISCONTINUED | COMMUNITY
Start: 2021-03-22 | End: 2021-05-04

## 2021-05-04 NOTE — HISTORY OF PRESENT ILLNESS
[FreeTextEntry1] : Riley Choudhary is a 66 year old man with a history of paroxysmal atrial fibrillation, hypertension, hyperlipidemia, who presents for cardiac examination prior to upcoming spine surgery.  He describes progressively worsening pain related to spinal stenosis and will be having surgery with Dr. Benoit at [facility] next week.  He has been doing well from a cardiac standpoint and describes a good baseline functional status (works as a ).  He has not been experiencing angina, dyspnea, or syncope.\par \par His atrial fibrillation is paroxysmal and was diagnosed in 2014; currently being treated with flecainide under the management of Dr. Lopez (electrophysiologist); he has an implanted loop recorder.  He has been tolerating anticoagulation.\par

## 2021-05-04 NOTE — PHYSICAL EXAM
[Well Developed] : well developed [Well Nourished] : well nourished [No Acute Distress] : no acute distress [Normal S1, S2] : normal S1, S2 [No Murmur] : no murmur [Clear Lung Fields] : clear lung fields [Good Air Entry] : good air entry [No Respiratory Distress] : no respiratory distress  [Soft] : abdomen soft [Non Tender] : non-tender [No Edema] : no edema [Alert and Oriented] : alert and oriented [de-identified] : No JVD [de-identified] : Pupils are round [de-identified] : Abnormal gait

## 2021-05-04 NOTE — DISCUSSION/SUMMARY
[FreeTextEntry1] : \par Preoperative cardiac examination: Mr. Choudhary is currently stable from a cardiac standpoint to proceed with upcoming spine surgery. I told him to discontinue Eliquis 3 days prior to surgery (last dose on Friday evening prior to surgery on Tuesday); anticoagulation should be resumed on postoperative day #1. He should continue perioperative beta blockade with metoprolol.\par \par Atrial fibrillation: Paroxysmal; currently in sinus rhythm and tolerating flecainide.\par \par Hypertension: Satisfactory control.

## 2021-05-04 NOTE — REVIEW OF SYSTEMS
[SOB] : no shortness of breath [Dyspnea on exertion] : not dyspnea during exertion [Chest Discomfort] : no chest discomfort [FreeTextEntry9] : Back pain

## 2021-05-04 NOTE — CARDIOLOGY SUMMARY
[de-identified] : 5/4/21, Normal sinus rhythm [de-identified] : 8/28/18\par Normal myocardial perfusion SPECT. [de-identified] : 8/23/18\par LVEF 60%.  Normal left ventricular size and systolic function. Mild diastolic dysfunction. Mild mitral regurgitation. Mild tricuspid regurgitation.

## 2021-05-07 DIAGNOSIS — Z01.818 ENCOUNTER FOR OTHER PREPROCEDURAL EXAMINATION: ICD-10-CM

## 2021-05-08 ENCOUNTER — APPOINTMENT (OUTPATIENT)
Dept: DISASTER EMERGENCY | Facility: CLINIC | Age: 66
End: 2021-05-08

## 2021-05-09 LAB — SARS-COV-2 N GENE NPH QL NAA+PROBE: NOT DETECTED

## 2021-05-10 RX ORDER — SODIUM CHLORIDE 9 MG/ML
1000 INJECTION, SOLUTION INTRAVENOUS
Refills: 0 | Status: DISCONTINUED | OUTPATIENT
Start: 2021-05-11 | End: 2021-05-11

## 2021-05-10 RX ORDER — OXYCODONE HYDROCHLORIDE 5 MG/1
10 TABLET ORAL ONCE
Refills: 0 | Status: DISCONTINUED | OUTPATIENT
Start: 2021-05-11 | End: 2021-05-11

## 2021-05-10 RX ORDER — FENTANYL CITRATE 50 UG/ML
50 INJECTION INTRAVENOUS
Refills: 0 | Status: DISCONTINUED | OUTPATIENT
Start: 2021-05-11 | End: 2021-05-11

## 2021-05-10 RX ORDER — HYDROMORPHONE HYDROCHLORIDE 2 MG/ML
0.5 INJECTION INTRAMUSCULAR; INTRAVENOUS; SUBCUTANEOUS
Refills: 0 | Status: DISCONTINUED | OUTPATIENT
Start: 2021-05-11 | End: 2021-05-11

## 2021-05-10 RX ORDER — MEPERIDINE HYDROCHLORIDE 50 MG/ML
12.5 INJECTION INTRAMUSCULAR; INTRAVENOUS; SUBCUTANEOUS
Refills: 0 | Status: DISCONTINUED | OUTPATIENT
Start: 2021-05-11 | End: 2021-05-11

## 2021-05-11 ENCOUNTER — OUTPATIENT (OUTPATIENT)
Dept: INPATIENT UNIT | Facility: HOSPITAL | Age: 66
LOS: 1 days | Discharge: HOME | End: 2021-05-11
Payer: COMMERCIAL

## 2021-05-11 ENCOUNTER — APPOINTMENT (OUTPATIENT)
Dept: NEUROSURGERY | Facility: HOSPITAL | Age: 66
End: 2021-05-11
Payer: COMMERCIAL

## 2021-05-11 VITALS
TEMPERATURE: 98 F | SYSTOLIC BLOOD PRESSURE: 142 MMHG | HEART RATE: 59 BPM | OXYGEN SATURATION: 100 % | HEIGHT: 70 IN | DIASTOLIC BLOOD PRESSURE: 81 MMHG | RESPIRATION RATE: 16 BRPM | WEIGHT: 179.9 LBS

## 2021-05-11 DIAGNOSIS — M48.062 SPINAL STENOSIS, LUMBAR REGION WITH NEUROGENIC CLAUDICATION: ICD-10-CM

## 2021-05-11 DIAGNOSIS — I73.00 RAYNAUD'S SYNDROME WITHOUT GANGRENE: ICD-10-CM

## 2021-05-11 DIAGNOSIS — I08.1 RHEUMATIC DISORDERS OF BOTH MITRAL AND TRICUSPID VALVES: ICD-10-CM

## 2021-05-11 DIAGNOSIS — E78.5 HYPERLIPIDEMIA, UNSPECIFIED: ICD-10-CM

## 2021-05-11 DIAGNOSIS — Z79.01 LONG TERM (CURRENT) USE OF ANTICOAGULANTS: ICD-10-CM

## 2021-05-11 DIAGNOSIS — M47.816 SPONDYLOSIS WITHOUT MYELOPATHY OR RADICULOPATHY, LUMBAR REGION: ICD-10-CM

## 2021-05-11 DIAGNOSIS — M43.17 SPONDYLOLISTHESIS, LUMBOSACRAL REGION: ICD-10-CM

## 2021-05-11 DIAGNOSIS — M71.38 OTHER BURSAL CYST, OTHER SITE: ICD-10-CM

## 2021-05-11 DIAGNOSIS — Z98.89 OTHER SPECIFIED POSTPROCEDURAL STATES: Chronic | ICD-10-CM

## 2021-05-11 DIAGNOSIS — Z98.890 OTHER SPECIFIED POSTPROCEDURAL STATES: Chronic | ICD-10-CM

## 2021-05-11 DIAGNOSIS — I48.0 PAROXYSMAL ATRIAL FIBRILLATION: ICD-10-CM

## 2021-05-11 DIAGNOSIS — Z79.899 OTHER LONG TERM (CURRENT) DRUG THERAPY: ICD-10-CM

## 2021-05-11 DIAGNOSIS — I10 ESSENTIAL (PRIMARY) HYPERTENSION: ICD-10-CM

## 2021-05-11 DIAGNOSIS — M47.26 OTHER SPONDYLOSIS WITH RADICULOPATHY, LUMBAR REGION: ICD-10-CM

## 2021-05-11 PROCEDURE — 80048 BASIC METABOLIC PNL TOTAL CA: CPT

## 2021-05-11 PROCEDURE — C1889: CPT

## 2021-05-11 PROCEDURE — 63048 LAM FACETEC &FORAMOT EA ADDL: CPT

## 2021-05-11 PROCEDURE — 63047 LAM FACETEC & FORAMOT LUMBAR: CPT | Mod: AS

## 2021-05-11 PROCEDURE — 63048 LAM FACETEC &FORAMOT EA ADDL: CPT | Mod: AS

## 2021-05-11 PROCEDURE — 85025 COMPLETE CBC W/AUTO DIFF WBC: CPT

## 2021-05-11 PROCEDURE — 36415 COLL VENOUS BLD VENIPUNCTURE: CPT

## 2021-05-11 PROCEDURE — 82962 GLUCOSE BLOOD TEST: CPT

## 2021-05-11 PROCEDURE — 76000 FLUOROSCOPY <1 HR PHYS/QHP: CPT

## 2021-05-11 PROCEDURE — 97163 PT EVAL HIGH COMPLEX 45 MIN: CPT | Mod: GP

## 2021-05-11 PROCEDURE — 63047 LAM FACETEC & FORAMOT LUMBAR: CPT

## 2021-05-11 RX ORDER — CEFAZOLIN SODIUM 1 G
1000 VIAL (EA) INJECTION EVERY 8 HOURS
Refills: 0 | Status: DISCONTINUED | OUTPATIENT
Start: 2021-05-11 | End: 2021-05-12

## 2021-05-11 RX ORDER — FAMOTIDINE 10 MG/ML
20 INJECTION INTRAVENOUS ONCE
Refills: 0 | Status: COMPLETED | OUTPATIENT
Start: 2021-05-11 | End: 2021-05-11

## 2021-05-11 RX ORDER — MAGNESIUM HYDROXIDE 400 MG/1
30 TABLET, CHEWABLE ORAL EVERY 12 HOURS
Refills: 0 | Status: DISCONTINUED | OUTPATIENT
Start: 2021-05-11 | End: 2021-05-12

## 2021-05-11 RX ORDER — FLECAINIDE ACETATE 50 MG
1 TABLET ORAL
Qty: 0 | Refills: 0 | DISCHARGE

## 2021-05-11 RX ORDER — ATORVASTATIN CALCIUM 80 MG/1
80 TABLET, FILM COATED ORAL AT BEDTIME
Refills: 0 | Status: DISCONTINUED | OUTPATIENT
Start: 2021-05-11 | End: 2021-05-12

## 2021-05-11 RX ORDER — ACETAMINOPHEN 500 MG
650 TABLET ORAL EVERY 6 HOURS
Refills: 0 | Status: DISCONTINUED | OUTPATIENT
Start: 2021-05-11 | End: 2021-05-12

## 2021-05-11 RX ORDER — LISINOPRIL 2.5 MG/1
10 TABLET ORAL DAILY
Refills: 0 | Status: DISCONTINUED | OUTPATIENT
Start: 2021-05-11 | End: 2021-05-12

## 2021-05-11 RX ORDER — CELECOXIB 200 MG/1
200 CAPSULE ORAL ONCE
Refills: 0 | Status: COMPLETED | OUTPATIENT
Start: 2021-05-11 | End: 2021-05-11

## 2021-05-11 RX ORDER — ACETAMINOPHEN 500 MG
975 TABLET ORAL ONCE
Refills: 0 | Status: COMPLETED | OUTPATIENT
Start: 2021-05-11 | End: 2021-05-11

## 2021-05-11 RX ORDER — PREGABALIN 225 MG/1
1000 CAPSULE ORAL DAILY
Refills: 0 | Status: DISCONTINUED | OUTPATIENT
Start: 2021-05-11 | End: 2021-05-12

## 2021-05-11 RX ORDER — OXYCODONE HYDROCHLORIDE 5 MG/1
5 TABLET ORAL EVERY 4 HOURS
Refills: 0 | Status: DISCONTINUED | OUTPATIENT
Start: 2021-05-11 | End: 2021-05-12

## 2021-05-11 RX ORDER — ONDANSETRON 8 MG/1
4 TABLET, FILM COATED ORAL EVERY 6 HOURS
Refills: 0 | Status: DISCONTINUED | OUTPATIENT
Start: 2021-05-11 | End: 2021-05-12

## 2021-05-11 RX ORDER — SENNA PLUS 8.6 MG/1
2 TABLET ORAL AT BEDTIME
Refills: 0 | Status: DISCONTINUED | OUTPATIENT
Start: 2021-05-11 | End: 2021-05-12

## 2021-05-11 RX ORDER — LISINOPRIL 2.5 MG/1
1 TABLET ORAL
Qty: 0 | Refills: 0 | DISCHARGE

## 2021-05-11 RX ORDER — CYCLOBENZAPRINE HYDROCHLORIDE 10 MG/1
10 TABLET, FILM COATED ORAL EVERY 8 HOURS
Refills: 0 | Status: DISCONTINUED | OUTPATIENT
Start: 2021-05-11 | End: 2021-05-12

## 2021-05-11 RX ORDER — METOPROLOL TARTRATE 50 MG
150 TABLET ORAL
Qty: 0 | Refills: 0 | DISCHARGE

## 2021-05-11 RX ORDER — PREGABALIN 225 MG/1
1 CAPSULE ORAL
Qty: 0 | Refills: 0 | DISCHARGE

## 2021-05-11 RX ORDER — FAMOTIDINE 10 MG/ML
20 INJECTION INTRAVENOUS EVERY 12 HOURS
Refills: 0 | Status: DISCONTINUED | OUTPATIENT
Start: 2021-05-11 | End: 2021-05-12

## 2021-05-11 RX ORDER — SODIUM CHLORIDE 9 MG/ML
3 INJECTION INTRAMUSCULAR; INTRAVENOUS; SUBCUTANEOUS EVERY 8 HOURS
Refills: 0 | Status: DISCONTINUED | OUTPATIENT
Start: 2021-05-11 | End: 2021-05-11

## 2021-05-11 RX ORDER — HYDROMORPHONE HYDROCHLORIDE 2 MG/ML
1 INJECTION INTRAMUSCULAR; INTRAVENOUS; SUBCUTANEOUS
Refills: 0 | Status: DISCONTINUED | OUTPATIENT
Start: 2021-05-11 | End: 2021-05-12

## 2021-05-11 RX ORDER — PROCHLORPERAZINE MALEATE 5 MG
10 TABLET ORAL ONCE
Refills: 0 | Status: COMPLETED | OUTPATIENT
Start: 2021-05-11 | End: 2021-05-11

## 2021-05-11 RX ORDER — CHOLECALCIFEROL (VITAMIN D3) 125 MCG
0 CAPSULE ORAL
Qty: 0 | Refills: 0 | DISCHARGE

## 2021-05-11 RX ORDER — FLECAINIDE ACETATE 50 MG
50 TABLET ORAL EVERY 12 HOURS
Refills: 0 | Status: DISCONTINUED | OUTPATIENT
Start: 2021-05-11 | End: 2021-05-12

## 2021-05-11 RX ORDER — OXYCODONE HYDROCHLORIDE 5 MG/1
10 TABLET ORAL EVERY 4 HOURS
Refills: 0 | Status: DISCONTINUED | OUTPATIENT
Start: 2021-05-11 | End: 2021-05-12

## 2021-05-11 RX ORDER — CEFAZOLIN SODIUM 1 G
1000 VIAL (EA) INJECTION EVERY 8 HOURS
Refills: 0 | Status: DISCONTINUED | OUTPATIENT
Start: 2021-05-11 | End: 2021-05-11

## 2021-05-11 RX ORDER — METOPROLOL TARTRATE 50 MG
150 TABLET ORAL DAILY
Refills: 0 | Status: DISCONTINUED | OUTPATIENT
Start: 2021-05-11 | End: 2021-05-12

## 2021-05-11 RX ORDER — ONDANSETRON 8 MG/1
4 TABLET, FILM COATED ORAL ONCE
Refills: 0 | Status: COMPLETED | OUTPATIENT
Start: 2021-05-11 | End: 2021-05-11

## 2021-05-11 RX ORDER — ROSUVASTATIN CALCIUM 5 MG/1
1 TABLET ORAL
Qty: 0 | Refills: 0 | DISCHARGE

## 2021-05-11 RX ADMIN — Medication 10 MILLIGRAM(S): at 11:55

## 2021-05-11 RX ADMIN — ATORVASTATIN CALCIUM 80 MILLIGRAM(S): 80 TABLET, FILM COATED ORAL at 21:34

## 2021-05-11 RX ADMIN — Medication 975 MILLIGRAM(S): at 07:07

## 2021-05-11 RX ADMIN — OXYCODONE HYDROCHLORIDE 10 MILLIGRAM(S): 5 TABLET ORAL at 13:50

## 2021-05-11 RX ADMIN — Medication 50 MILLIGRAM(S): at 21:34

## 2021-05-11 RX ADMIN — SENNA PLUS 2 TABLET(S): 8.6 TABLET ORAL at 21:34

## 2021-05-11 RX ADMIN — CELECOXIB 200 MILLIGRAM(S): 200 CAPSULE ORAL at 07:07

## 2021-05-11 RX ADMIN — Medication 1000 MILLIGRAM(S): at 16:15

## 2021-05-11 RX ADMIN — HYDROMORPHONE HYDROCHLORIDE 0.5 MILLIGRAM(S): 2 INJECTION INTRAMUSCULAR; INTRAVENOUS; SUBCUTANEOUS at 15:45

## 2021-05-11 RX ADMIN — CELECOXIB 200 MILLIGRAM(S): 200 CAPSULE ORAL at 07:06

## 2021-05-11 RX ADMIN — FAMOTIDINE 20 MILLIGRAM(S): 10 INJECTION INTRAVENOUS at 07:06

## 2021-05-11 RX ADMIN — Medication 975 MILLIGRAM(S): at 07:06

## 2021-05-11 RX ADMIN — ONDANSETRON 4 MILLIGRAM(S): 8 TABLET, FILM COATED ORAL at 11:15

## 2021-05-11 RX ADMIN — OXYCODONE HYDROCHLORIDE 10 MILLIGRAM(S): 5 TABLET ORAL at 12:24

## 2021-05-11 RX ADMIN — HYDROMORPHONE HYDROCHLORIDE 0.5 MILLIGRAM(S): 2 INJECTION INTRAMUSCULAR; INTRAVENOUS; SUBCUTANEOUS at 13:59

## 2021-05-11 NOTE — BRIEF OPERATIVE NOTE - NSICDXBRIEFPROCEDURE_GEN_ALL_CORE_FT
PROCEDURES:  Laminectomy or decompressive laminectomy, spine, lumbar, 1 level 11-May-2021 10:37:14  Riley Benoit

## 2021-05-11 NOTE — BRIEF OPERATIVE NOTE - NSICDXBRIEFPREOP_GEN_ALL_CORE_FT
PRE-OP DIAGNOSIS:  Lumbar stenosis with neurogenic claudication 11-May-2021 10:37:31  Riley Benoit  Synovial cyst of lumbar spine 11-May-2021 10:37:41  Riley Benoit  Lumbar spondylosis 11-May-2021 10:37:56  Riley Benoit

## 2021-05-11 NOTE — BRIEF OPERATIVE NOTE - NSICDXBRIEFPOSTOP_GEN_ALL_CORE_FT
POST-OP DIAGNOSIS:  Lumbar stenosis 11-May-2021 10:38:36  Riley Benoit  Lumbar spondylosis 11-May-2021 10:38:53  Riley Benoit  Synovial cyst of lumbar spine 11-May-2021 10:39:05  Riley Benoit

## 2021-05-11 NOTE — BRIEF OPERATIVE NOTE - OPERATION/FINDINGS
L4 total laminectomy with bilateral foraminotomies at L3/4 and L4/5. good decompression of synovial cyst.

## 2021-05-11 NOTE — ASU PATIENT PROFILE, ADULT - PMH
Atrial fibrillation    Hypercholesteremia    Hypertension    Lumbar stenosis    Raynaud's disease without gangrene

## 2021-05-12 ENCOUNTER — TRANSCRIPTION ENCOUNTER (OUTPATIENT)
Age: 66
End: 2021-05-12

## 2021-05-12 VITALS
OXYGEN SATURATION: 99 % | RESPIRATION RATE: 18 BRPM | DIASTOLIC BLOOD PRESSURE: 70 MMHG | TEMPERATURE: 98 F | HEART RATE: 67 BPM | SYSTOLIC BLOOD PRESSURE: 122 MMHG

## 2021-05-12 LAB
ANION GAP SERPL CALC-SCNC: 4 MMOL/L — LOW (ref 5–17)
BASOPHILS # BLD AUTO: 0.02 K/UL — SIGNIFICANT CHANGE UP (ref 0–0.2)
BASOPHILS NFR BLD AUTO: 0.1 % — SIGNIFICANT CHANGE UP (ref 0–2)
BUN SERPL-MCNC: 12 MG/DL — SIGNIFICANT CHANGE UP (ref 7–23)
CALCIUM SERPL-MCNC: 9.1 MG/DL — SIGNIFICANT CHANGE UP (ref 8.5–10.1)
CHLORIDE SERPL-SCNC: 109 MMOL/L — HIGH (ref 96–108)
CO2 SERPL-SCNC: 28 MMOL/L — SIGNIFICANT CHANGE UP (ref 22–31)
CREAT SERPL-MCNC: 0.95 MG/DL — SIGNIFICANT CHANGE UP (ref 0.5–1.3)
EOSINOPHIL # BLD AUTO: 0 K/UL — SIGNIFICANT CHANGE UP (ref 0–0.5)
EOSINOPHIL NFR BLD AUTO: 0 % — SIGNIFICANT CHANGE UP (ref 0–6)
GLUCOSE SERPL-MCNC: 92 MG/DL — SIGNIFICANT CHANGE UP (ref 70–99)
HCT VFR BLD CALC: 40.7 % — SIGNIFICANT CHANGE UP (ref 39–50)
HGB BLD-MCNC: 13.6 G/DL — SIGNIFICANT CHANGE UP (ref 13–17)
IMM GRANULOCYTES NFR BLD AUTO: 0.5 % — SIGNIFICANT CHANGE UP (ref 0–1.5)
LYMPHOCYTES # BLD AUTO: 1.4 K/UL — SIGNIFICANT CHANGE UP (ref 1–3.3)
LYMPHOCYTES # BLD AUTO: 9 % — LOW (ref 13–44)
MCHC RBC-ENTMCNC: 31.6 PG — SIGNIFICANT CHANGE UP (ref 27–34)
MCHC RBC-ENTMCNC: 33.4 GM/DL — SIGNIFICANT CHANGE UP (ref 32–36)
MCV RBC AUTO: 94.7 FL — SIGNIFICANT CHANGE UP (ref 80–100)
MONOCYTES # BLD AUTO: 1.26 K/UL — HIGH (ref 0–0.9)
MONOCYTES NFR BLD AUTO: 8.1 % — SIGNIFICANT CHANGE UP (ref 2–14)
NEUTROPHILS # BLD AUTO: 12.77 K/UL — HIGH (ref 1.8–7.4)
NEUTROPHILS NFR BLD AUTO: 82.3 % — HIGH (ref 43–77)
PLATELET # BLD AUTO: 196 K/UL — SIGNIFICANT CHANGE UP (ref 150–400)
POTASSIUM SERPL-MCNC: 4.1 MMOL/L — SIGNIFICANT CHANGE UP (ref 3.5–5.3)
POTASSIUM SERPL-SCNC: 4.1 MMOL/L — SIGNIFICANT CHANGE UP (ref 3.5–5.3)
RBC # BLD: 4.3 M/UL — SIGNIFICANT CHANGE UP (ref 4.2–5.8)
RBC # FLD: 11.9 % — SIGNIFICANT CHANGE UP (ref 10.3–14.5)
SODIUM SERPL-SCNC: 141 MMOL/L — SIGNIFICANT CHANGE UP (ref 135–145)
WBC # BLD: 15.52 K/UL — HIGH (ref 3.8–10.5)
WBC # FLD AUTO: 15.52 K/UL — HIGH (ref 3.8–10.5)

## 2021-05-12 RX ORDER — SENNA PLUS 8.6 MG/1
2 TABLET ORAL
Qty: 28 | Refills: 0
Start: 2021-05-12 | End: 2021-05-25

## 2021-05-12 RX ORDER — CYCLOBENZAPRINE HYDROCHLORIDE 10 MG/1
1 TABLET, FILM COATED ORAL
Qty: 42 | Refills: 0
Start: 2021-05-12 | End: 2021-05-25

## 2021-05-12 RX ORDER — ACETAMINOPHEN 500 MG
2 TABLET ORAL
Qty: 0 | Refills: 0 | DISCHARGE
Start: 2021-05-12

## 2021-05-12 RX ORDER — APIXABAN 2.5 MG/1
1 TABLET, FILM COATED ORAL
Qty: 0 | Refills: 0 | DISCHARGE

## 2021-05-12 RX ADMIN — Medication 150 MILLIGRAM(S): at 09:36

## 2021-05-12 RX ADMIN — PREGABALIN 1000 MICROGRAM(S): 225 CAPSULE ORAL at 09:36

## 2021-05-12 RX ADMIN — Medication 50 MILLIGRAM(S): at 09:36

## 2021-05-12 RX ADMIN — Medication 1000 MILLIGRAM(S): at 00:09

## 2021-05-12 RX ADMIN — Medication 1 TABLET(S): at 09:06

## 2021-05-12 RX ADMIN — Medication 1000 MILLIGRAM(S): at 09:37

## 2021-05-12 RX ADMIN — Medication 650 MILLIGRAM(S): at 10:40

## 2021-05-12 RX ADMIN — Medication 650 MILLIGRAM(S): at 10:07

## 2021-05-12 RX ADMIN — CYCLOBENZAPRINE HYDROCHLORIDE 10 MILLIGRAM(S): 10 TABLET, FILM COATED ORAL at 09:06

## 2021-05-12 RX ADMIN — CYCLOBENZAPRINE HYDROCHLORIDE 10 MILLIGRAM(S): 10 TABLET, FILM COATED ORAL at 00:09

## 2021-05-12 NOTE — PHYSICAL THERAPY INITIAL EVALUATION ADULT - CRITERIA FOR SKILLED THERAPEUTIC INTERVENTIONS
pt does not require PT intervention in the acute care setting; pt currently I; recommend daily OOB / amb as mae on nursing floor care

## 2021-05-12 NOTE — DISCHARGE NOTE PROVIDER - HOSPITAL COURSE
Patient is a 66 year old male PMHx  Afib, Hypertension, Hyperlipidemia presents to  for scheduled procedure. He underwent L4 total laminectomy with L3/4 and L4/5 foraminal decompression on 5/11. A coude catheter was placed as diaz initially was unable to be advanced. A drain was placed. He was transferred to [facility] without issue. Tolerating diet. Patient is a 66 year old male PMHx  Afib, Hypertension, Hyperlipidemia presents to  for scheduled procedure. He underwent L4 total laminectomy with L3/4 and L4/5 foraminal decompression on 5/11. A coude catheter was placed as diaz initially was unable to be advanced. A drain was placed. He was transferred to [facility] without issue. Tolerating diet. Drain removed at bedside without complication. Ambulating with PT recommending discharge home. Pending trial of void. Patient is a 66 year old male PMHx  Afib, Hypertension, Hyperlipidemia presents to  for scheduled procedure. He underwent L4 total laminectomy with L3/4 and L4/5 foraminal decompression on 5/11. A coude catheter was placed as diaz initially was unable to be advanced. A drain was placed. He was transferred to [facility] without issue. Tolerating diet. Drain removed at bedside without complication. Ambulating with PT recommending discharge home. Now voiding without issue. Patient is stable from a neurosurgical standpoint for discharge.

## 2021-05-12 NOTE — PHYSICAL THERAPY INITIAL EVALUATION ADULT - MODALITIES TREATMENT COMMENTS
pt left seated in chair post Eval; Hemovac intact; callbell in reach; mae well; pain level 4/10; RN Windy aware

## 2021-05-12 NOTE — DISCHARGE NOTE PROVIDER - NSDCCPTREATMENT_GEN_ALL_CORE_FT
PRINCIPAL PROCEDURE  Procedure: Laminectomy or decompressive laminectomy, spine, lumbar, 1 level  Findings and Treatment:

## 2021-05-12 NOTE — DISCHARGE NOTE PROVIDER - PROVIDER TOKENS
PROVIDER:[TOKEN:[[id number]],FOLLOWUP:[2 weeks]],FREE:[LAST:[Primary Care],FIRST:[Physician],PHONE:[(   )    -],FAX:[(   )    -],ADDRESS:[Please follow up with your primary care physician upon discharge.]]

## 2021-05-12 NOTE — DISCHARGE NOTE PROVIDER - NSDCCPCAREPLAN_GEN_ALL_CORE_FT
PRINCIPAL DISCHARGE DIAGNOSIS  Diagnosis: Lumbar stenosis with neurogenic claudication  Assessment and Plan of Treatment: Please follow up with Dr. Benoit in 2 weeks. Please call the office, or visit the nearest ER if you experience sudden lower extremity weakness, fevers >101.3, swelling, or drainage from the wound site. You may shower, and remove dressing prior to shower. Do not rub over wound, pat dry. Do not apply lotions to wound. Do not lift anything >10lbs, avoid bending/twisting, do not drive at this time.       PRINCIPAL DISCHARGE DIAGNOSIS  Diagnosis: Lumbar stenosis with neurogenic claudication  Assessment and Plan of Treatment: Please follow up with Dr. Benoit in 2 weeks. Please call the office, or visit the nearest ER if you experience sudden lower extremity weakness, fevers >101.3, swelling, or drainage from the wound site. You may shower, and remove dressing prior to shower. Do not rub over wound, pat dry and leave open to air. Do not apply lotions to wound. Do not lift anything >10lbs, avoid bending/twisting, do not drive at this time. Do not submerge wound in water, no baths or swimming at this time. Do not drive under the influence of narcotic medication.   You may resume your Eliquis in 1 week.

## 2021-05-12 NOTE — DISCHARGE NOTE PROVIDER - NSDCMRMEDTOKEN_GEN_ALL_CORE_FT
Eliquis 5 mg oral tablet: 1 tab(s) orally 2 times a day  flecainide 50 mg oral tablet: 1 tab(s) orally every 12 hours  lisinopril 10 mg oral tablet: 1 tab(s) orally once a day  Metoprolol Succinate ER: 150 milligram(s) orally once a day  rosuvastatin 20 mg oral tablet: 1 tab(s) orally once a day  Vitamin B12 1000 mcg oral tablet: 1 tab(s) orally once a day  Vitamin D3 1000 intl units (25 mcg) oral tablet:    acetaminophen 325 mg oral tablet: 2 tab(s) orally every 6 hours, As needed, Temp greater or equal to 38C (100.4F), Mild Pain (1 - 3)  cyclobenzaprine 10 mg oral tablet: 1 tab(s) orally every 8 hours, As Needed -for muscle spasm   flecainide 50 mg oral tablet: 1 tab(s) orally every 12 hours  lisinopril 10 mg oral tablet: 1 tab(s) orally once a day  Metoprolol Succinate ER: 150 milligram(s) orally once a day  oxycodone-acetaminophen 5 mg-325 mg oral tablet: 1 tab(s) orally every 4 to 6 hours, As Needed -for severe pain MDD:6  rosuvastatin 20 mg oral tablet: 1 tab(s) orally once a day  senna oral tablet: 2 tab(s) orally once a day (at bedtime)  Vitamin B12 1000 mcg oral tablet: 1 tab(s) orally once a day  Vitamin D3 1000 intl units (25 mcg) oral tablet:

## 2021-05-12 NOTE — DISCHARGE NOTE NURSING/CASE MANAGEMENT/SOCIAL WORK - PATIENT PORTAL LINK FT
You can access the FollowMyHealth Patient Portal offered by [facility] by registering at the following website: [email or web address]. By joining [facility]’s FollowMyHealth portal, you will also be able to view your health information using other applications (apps) compatible with our system.

## 2021-05-12 NOTE — DISCHARGE NOTE PROVIDER - CARE PROVIDER_API CALL
Riley Benoit; PhD)  Neurosurgery  [address] [facility], [address]nd [place] [address]  Phone: [phone]  Fax: [phone]  Follow Up Time: 2 weeks    Primary Care, Physician  Please follow up with your primary care physician upon discharge.  Phone: (   )    -  Fax: (   )    -  Follow Up Time:

## 2021-05-24 ENCOUNTER — APPOINTMENT (OUTPATIENT)
Dept: NEUROSURGERY | Facility: CLINIC | Age: 66
End: 2021-05-24
Payer: COMMERCIAL

## 2021-05-24 VITALS
DIASTOLIC BLOOD PRESSURE: 70 MMHG | TEMPERATURE: 97.7 F | OXYGEN SATURATION: 99 % | HEART RATE: 75 BPM | WEIGHT: 185 LBS | HEIGHT: 70 IN | BODY MASS INDEX: 26.48 KG/M2 | SYSTOLIC BLOOD PRESSURE: 118 MMHG

## 2021-05-24 PROCEDURE — 99024 POSTOP FOLLOW-UP VISIT: CPT

## 2021-05-24 NOTE — CONSULT LETTER
[Dear  ___] : Dear  [unfilled], [Courtesy Letter:] : I had the pleasure of seeing your patient, [unfilled], in my office today. [Sincerely,] : Sincerely, [FreeTextEntry2] : Leonardo Frankel, DO\par [address]\par [place] [address] \par  [FreeTextEntry1] : Riley Choudhary is a 66-year-old male who presents today for postop evaluation.  On 5/11/21 patient underwent L4 total laminectomy with bilateral foraminotomies at L3/4 and L4/5 with decompression of synovial cyst for lumbar spondylosis stenosis with neurogenic claudication.  Patient reports lower back soreness and achy pain.  He reports a focal sharp pain just left to his incision site.  He reports paresthesias and leg pain have resolved.  He is able to stand and walk for a longer prolonged period of time.   He denies bowel or bladder dysfunction or lower extremity weakness.  He denies any lower extremity redness, drainage, or swelling.  Denies fever or chills.  Patient reports he no longer requires any pain medication.\par \par There is no imaging to review with the patient.\par \par Patient is alert and oriented.  No distress noted.  Steri-Strips have fallen off.  Incision without any redness, drainage, or swelling.  No fever or chills noted.  Strength to bilateral legs 5/5.  Negative clonus.  Left patella reflex brisk.  Right patella reflex present.  Right Achilles tendon reflex absent.  Right Achilles tendon reflex present.\par \par Patient is recovering well from surgery.  At this time patient should continue to avoid any bending, lifting, or twisting.  He should avoid any submerging of incision.  We will follow-up in office in 4 weeks with an updated x-ray lumbar spine.  Patient is aware to call with any further questions or concerns or with any new or worsening symptoms. [FreeTextEntry3] : Liliana Hernández, MSN, FNP-BC\par Nurse Practitioner\par Neurosurgery\par [address] [place] [address], 2nd floor \[place] [address] \par Office: [phone] \par Fax: [phone]\par \par

## 2021-06-07 ENCOUNTER — NON-APPOINTMENT (OUTPATIENT)
Age: 66
End: 2021-06-07

## 2021-06-07 ENCOUNTER — APPOINTMENT (OUTPATIENT)
Dept: ELECTROPHYSIOLOGY | Facility: CLINIC | Age: 66
End: 2021-06-07
Payer: COMMERCIAL

## 2021-06-07 PROCEDURE — G2066: CPT

## 2021-06-07 PROCEDURE — 93298 REM INTERROG DEV EVAL SCRMS: CPT

## 2021-06-20 NOTE — ASU PATIENT PROFILE, ADULT - NS PRO ABUSE SCREEN AFRAID ANYONE YN
no [facility]  Medicine  [address] [place], [place]   Phone: [phone]  Fax:   Follow Up Time: 1-3 Days

## 2021-06-28 ENCOUNTER — APPOINTMENT (OUTPATIENT)
Dept: NEUROSURGERY | Facility: CLINIC | Age: 66
End: 2021-06-28
Payer: COMMERCIAL

## 2021-06-28 VITALS
BODY MASS INDEX: 26.48 KG/M2 | HEART RATE: 70 BPM | DIASTOLIC BLOOD PRESSURE: 76 MMHG | OXYGEN SATURATION: 99 % | SYSTOLIC BLOOD PRESSURE: 128 MMHG | WEIGHT: 185 LBS | TEMPERATURE: 97.9 F | HEIGHT: 70 IN

## 2021-06-28 PROCEDURE — 99024 POSTOP FOLLOW-UP VISIT: CPT

## 2021-07-01 NOTE — CONSULT LETTER
[Dear  ___] : Dear  [unfilled], [Courtesy Letter:] : I had the pleasure of seeing your patient, [unfilled], in my office today. [Sincerely,] : Sincerely, [FreeTextEntry2] : Leonardo Frankel, DO\par [address]\[place] [address]  [FreeTextEntry1] : Riley Choudhary is a 66-year-old male who presents today for postop evaluation. On 5/11/21 patient underwent L4 total laminectomy with bilateral foraminotomies at L3/4 and L4/5 with decompression of synovial cyst for lumbar spondylosis stenosis with neurogenic claudication.  Patient reports walking is improved.  He denies any lower back pain, leg shooting pains or weakness.  He denies any bowel or bladder dysfunction.  He reports a tingling sensation to his left inner knee for approximately 3 weeks.  He denies any fever chills or any redness, drainage, or for swelling to incision site or surrounding skin.  \par \par Patient underwent x-ray of the lumbar spine performed on 6/23/2021 at [facility] indicating "bilateral L4 laminectomies and mild multilevel lumbar degenerative changes."\par \par Patient is alert and oriented. No distress noted. Incision without any redness, drainage, or swelling. No fever or chills noted. Strength to bilateral legs 5/5. Negative clonus. Brisk bilateral patellar reflexes.  Bilateral Achilles tendon reflexes present and equal.  Sensation to light touch and temperature to lower extremities equal and normal.  Decreased sensation to sharp pinprick noted to left inner knee.\par \par Patient is recovering well from surgery.  At this time patient should ease back into normal activity.  We will follow-up in approximately 6 weeks from today.  Patient aware to call with any further questions or concerns or with any new or worsening symptoms. [FreeTextEntry3] : Liliana Hernández, MSN, FNP-BC\par Nurse Practitioner\par Neurosurgery\par [address] [place] [address], 2nd floor \[place] [address] \par Office: [phone] \par Fax: [phone]\par \par

## 2021-07-02 ENCOUNTER — NON-APPOINTMENT (OUTPATIENT)
Age: 66
End: 2021-07-02

## 2021-07-12 ENCOUNTER — APPOINTMENT (OUTPATIENT)
Dept: ELECTROPHYSIOLOGY | Facility: CLINIC | Age: 66
End: 2021-07-12
Payer: COMMERCIAL

## 2021-07-12 ENCOUNTER — NON-APPOINTMENT (OUTPATIENT)
Age: 66
End: 2021-07-12

## 2021-07-12 PROCEDURE — G2066: CPT

## 2021-07-12 PROCEDURE — 93298 REM INTERROG DEV EVAL SCRMS: CPT

## 2021-08-06 ENCOUNTER — APPOINTMENT (OUTPATIENT)
Dept: NEUROSURGERY | Facility: CLINIC | Age: 66
End: 2021-08-06
Payer: COMMERCIAL

## 2021-08-06 VITALS
HEART RATE: 60 BPM | WEIGHT: 181 LBS | DIASTOLIC BLOOD PRESSURE: 83 MMHG | BODY MASS INDEX: 25.91 KG/M2 | SYSTOLIC BLOOD PRESSURE: 136 MMHG | TEMPERATURE: 96.7 F | OXYGEN SATURATION: 97 % | HEIGHT: 70 IN

## 2021-08-06 DIAGNOSIS — M43.17 SPONDYLOLISTHESIS, LUMBOSACRAL REGION: ICD-10-CM

## 2021-08-06 DIAGNOSIS — M48.062 SPINAL STENOSIS, LUMBAR REGION WITH NEUROGENIC CLAUDICATION: ICD-10-CM

## 2021-08-06 DIAGNOSIS — Z98.890 OTHER SPECIFIED POSTPROCEDURAL STATES: ICD-10-CM

## 2021-08-06 DIAGNOSIS — M54.16 RADICULOPATHY, LUMBAR REGION: ICD-10-CM

## 2021-08-06 DIAGNOSIS — M47.816 SPONDYLOSIS W/OUT MYELOPATHY OR RADICULOPATHY, LUMBAR REGION: ICD-10-CM

## 2021-08-06 PROCEDURE — 99024 POSTOP FOLLOW-UP VISIT: CPT

## 2021-08-16 ENCOUNTER — NON-APPOINTMENT (OUTPATIENT)
Age: 66
End: 2021-08-16

## 2021-08-16 ENCOUNTER — APPOINTMENT (OUTPATIENT)
Dept: ELECTROPHYSIOLOGY | Facility: CLINIC | Age: 66
End: 2021-08-16
Payer: COMMERCIAL

## 2021-08-16 PROBLEM — M48.062 LUMBAR STENOSIS WITH NEUROGENIC CLAUDICATION: Status: RESOLVED | Noted: 2021-04-06 | Resolved: 2021-08-16

## 2021-08-16 PROBLEM — M43.17 ACQUIRED SPONDYLOLISTHESIS OF LUMBOSACRAL REGION: Status: ACTIVE | Noted: 2021-04-06

## 2021-08-16 PROBLEM — M47.816 LUMBAR SPONDYLOSIS: Status: RESOLVED | Noted: 2021-04-06 | Resolved: 2021-08-16

## 2021-08-16 PROBLEM — Z98.890 S/P LUMBAR LAMINECTOMY: Status: ACTIVE | Noted: 2021-08-16

## 2021-08-16 PROCEDURE — G2066: CPT

## 2021-08-16 PROCEDURE — 93298 REM INTERROG DEV EVAL SCRMS: CPT

## 2021-08-16 NOTE — REASON FOR VISIT
[de-identified] : L4 total laminectomy  [de-identified] : 5/11/2021 [de-identified] : 14 [Spouse] : spouse

## 2021-08-16 NOTE — CONSULT LETTER
[Dear  ___] : Dear  [unfilled], [Courtesy Letter:] : I had the pleasure of seeing your patient, [unfilled], in my office today. [Sincerely,] : Sincerely, [FreeTextEntry2] : Leonardo Frankel, DO\par [address]\[place] [address] [FreeTextEntry1] : This very pleasant 66-year-old gentleman returns for routine postsurgical follow-up.  You may recall that the patient presented with severe lower back pain associated with neurogenic claudication and radiculopathy.  The patient had grade 1 spondylolisthesis without dynamic instability as well as extensive spondylosis creating stenosis.  Patient was admitted to [facility] on May 11, 2021 and underwent an L4 total laminectomy with bilateral foraminotomies at L3-4 and L4-5 and decompression of a synovial cyst on the left at L4-5.  The patient tolerated the procedure extremely well and has now been recovering in his home environment.  The patient indicates dramatic improvement in his functional ability.  He no longer has neurogenic claudication or radicular symptoms.  He denies any pain in the incision area or radiating to the legs.  He is walking without difficulty.  At this point in time he is no longer taking any pain medication.\par \par On examination the incision area has healed extremely well with no signs of redness swelling or fluctuance.  The patient has very good lower back range of motion.  The patient has good strength of plantar and dorsi flexion.  The patient has good sensation in both lower extremities.\par \par I am very pleased with the patient's recovery at this early junction after decompressive lumbar surgery.  I have no concerns regarding stability of the spondylolisthesis currently.  I have indicated to the patient that he may increase all normal activities without restriction.  Currently I have not arranged for any further follow-up but I would be more than pleased to see the patient again at any time should the need arise.\par \par Thank you for kindly including me in the evaluation and support of your patient.  Please do not hesitate to contact me should you have any questions or concerns about his lumbar surgery performed in May 2021 or his ongoing follow-up care. [FreeTextEntry3] : Riley Benoit MD, PhD, FRCPSC\par  of Neurosurgery\par [facility]\par  of Neurosurgery\par [facility]\par [address] [place] [address], [address] Floor\[place] [address]\par Tel: [phone]\par FAX: [phone]\par

## 2021-09-20 ENCOUNTER — NON-APPOINTMENT (OUTPATIENT)
Age: 66
End: 2021-09-20

## 2021-09-20 ENCOUNTER — APPOINTMENT (OUTPATIENT)
Dept: ELECTROPHYSIOLOGY | Facility: CLINIC | Age: 66
End: 2021-09-20
Payer: COMMERCIAL

## 2021-09-20 PROCEDURE — 93298 REM INTERROG DEV EVAL SCRMS: CPT

## 2021-09-20 PROCEDURE — G2066: CPT

## 2021-09-29 RX ORDER — APIXABAN 5 MG/1
5 TABLET, FILM COATED ORAL
Qty: 180 | Refills: 1 | Status: ACTIVE | COMMUNITY
Start: 2020-02-21 | End: 1900-01-01
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
## 2021-10-25 ENCOUNTER — APPOINTMENT (OUTPATIENT)
Dept: ELECTROPHYSIOLOGY | Facility: CLINIC | Age: 66
End: 2021-10-25
Payer: COMMERCIAL

## 2021-10-25 ENCOUNTER — NON-APPOINTMENT (OUTPATIENT)
Age: 66
End: 2021-10-25

## 2021-10-25 PROCEDURE — G2066: CPT

## 2021-10-25 PROCEDURE — 93298 REM INTERROG DEV EVAL SCRMS: CPT

## 2021-12-02 ENCOUNTER — NON-APPOINTMENT (OUTPATIENT)
Age: 66
End: 2021-12-02

## 2021-12-02 ENCOUNTER — APPOINTMENT (OUTPATIENT)
Dept: ELECTROPHYSIOLOGY | Facility: CLINIC | Age: 66
End: 2021-12-02
Payer: COMMERCIAL

## 2021-12-02 PROCEDURE — 93298 REM INTERROG DEV EVAL SCRMS: CPT

## 2021-12-02 PROCEDURE — G2066: CPT

## 2021-12-27 ENCOUNTER — APPOINTMENT (OUTPATIENT)
Dept: CARDIOLOGY | Facility: CLINIC | Age: 66
End: 2021-12-27
Payer: COMMERCIAL

## 2021-12-27 ENCOUNTER — NON-APPOINTMENT (OUTPATIENT)
Age: 66
End: 2021-12-27

## 2021-12-27 VITALS
WEIGHT: 180 LBS | DIASTOLIC BLOOD PRESSURE: 70 MMHG | BODY MASS INDEX: 25.77 KG/M2 | OXYGEN SATURATION: 98 % | HEART RATE: 72 BPM | SYSTOLIC BLOOD PRESSURE: 120 MMHG | HEIGHT: 70 IN

## 2021-12-27 DIAGNOSIS — R00.2 PALPITATIONS: ICD-10-CM

## 2021-12-27 DIAGNOSIS — E78.5 HYPERLIPIDEMIA, UNSPECIFIED: ICD-10-CM

## 2021-12-27 DIAGNOSIS — I10 ESSENTIAL (PRIMARY) HYPERTENSION: ICD-10-CM

## 2021-12-27 PROCEDURE — 99214 OFFICE O/P EST MOD 30 MIN: CPT

## 2021-12-27 PROCEDURE — 93000 ELECTROCARDIOGRAM COMPLETE: CPT

## 2021-12-27 RX ORDER — FLECAINIDE ACETATE 50 MG/1
50 TABLET ORAL
Qty: 180 | Refills: 3 | Status: ACTIVE | COMMUNITY
Start: 2020-01-31 | End: 1900-01-01

## 2021-12-27 RX ORDER — CEFADROXIL 500 MG/1
500 CAPSULE ORAL
Qty: 10 | Refills: 0 | Status: DISCONTINUED | COMMUNITY
Start: 2021-03-22 | End: 2021-12-27

## 2021-12-27 NOTE — HISTORY OF PRESENT ILLNESS
[FreeTextEntry1] : Riley Choudhary is a 66 year old man with a history of paroxysmal atrial fibrillation, hypertension, hyperlipidemia, who presents for cardiac examination. LINQ in place. Pt is s/p LS spine surgery and feels well. Meds were reviewed. Pt is minimally symptomatic.

## 2021-12-27 NOTE — CARDIOLOGY SUMMARY
[de-identified] : 5/4/21, Normal sinus rhythm [de-identified] : 8/28/18\par Normal myocardial perfusion SPECT. [de-identified] : 8/23/18\par LVEF 60%.  Normal left ventricular size and systolic function. Mild diastolic dysfunction. Mild mitral regurgitation. Mild tricuspid regurgitation.

## 2021-12-27 NOTE — DISCUSSION/SUMMARY
[FreeTextEntry1] : \par \par \par Atrial fibrillation: Paroxysmal; currently in sinus rhythm and tolerating flecainide. Continue Eliquis.\par \par Hypertension: Satisfactory control.

## 2021-12-27 NOTE — REASON FOR VISIT
[Symptom and Test Evaluation] : symptom and test evaluation [Arrhythmia/ECG Abnorrmalities] : arrhythmia/ECG abnormalities [Other: ____] : [unfilled]

## 2022-01-05 ENCOUNTER — APPOINTMENT (OUTPATIENT)
Dept: ELECTROPHYSIOLOGY | Facility: CLINIC | Age: 67
End: 2022-01-05
Payer: COMMERCIAL

## 2022-01-05 ENCOUNTER — NON-APPOINTMENT (OUTPATIENT)
Age: 67
End: 2022-01-05

## 2022-01-05 PROCEDURE — 93298 REM INTERROG DEV EVAL SCRMS: CPT

## 2022-01-05 PROCEDURE — G2066: CPT

## 2022-02-09 ENCOUNTER — APPOINTMENT (OUTPATIENT)
Dept: ELECTROPHYSIOLOGY | Facility: CLINIC | Age: 67
End: 2022-02-09
Payer: COMMERCIAL

## 2022-02-09 ENCOUNTER — NON-APPOINTMENT (OUTPATIENT)
Age: 67
End: 2022-02-09

## 2022-02-09 PROCEDURE — G2066: CPT

## 2022-02-09 PROCEDURE — 93298 REM INTERROG DEV EVAL SCRMS: CPT

## 2022-03-14 ENCOUNTER — NON-APPOINTMENT (OUTPATIENT)
Age: 67
End: 2022-03-14

## 2022-03-14 ENCOUNTER — APPOINTMENT (OUTPATIENT)
Dept: ELECTROPHYSIOLOGY | Facility: CLINIC | Age: 67
End: 2022-03-14
Payer: COMMERCIAL

## 2022-03-14 PROCEDURE — G2066: CPT

## 2022-03-14 PROCEDURE — 93298 REM INTERROG DEV EVAL SCRMS: CPT

## 2022-04-18 ENCOUNTER — APPOINTMENT (OUTPATIENT)
Dept: ELECTROPHYSIOLOGY | Facility: CLINIC | Age: 67
End: 2022-04-18
Payer: COMMERCIAL

## 2022-04-18 ENCOUNTER — NON-APPOINTMENT (OUTPATIENT)
Age: 67
End: 2022-04-18

## 2022-04-18 PROCEDURE — 93298 REM INTERROG DEV EVAL SCRMS: CPT

## 2022-04-18 PROCEDURE — G2066: CPT

## 2022-05-22 ENCOUNTER — APPOINTMENT (OUTPATIENT)
Dept: ELECTROPHYSIOLOGY | Facility: CLINIC | Age: 67
End: 2022-05-22

## 2022-08-01 ENCOUNTER — APPOINTMENT (RX ONLY)
Dept: URBAN - METROPOLITAN AREA CLINIC 150 | Facility: CLINIC | Age: 67
Setting detail: DERMATOLOGY
End: 2022-08-01

## 2022-08-01 DIAGNOSIS — L72.0 EPIDERMAL CYST: ICD-10-CM

## 2022-08-01 DIAGNOSIS — D18.0 HEMANGIOMA: ICD-10-CM

## 2022-08-01 DIAGNOSIS — L57.0 ACTINIC KERATOSIS: ICD-10-CM

## 2022-08-01 DIAGNOSIS — L81.8 OTHER SPECIFIED DISORDERS OF PIGMENTATION: ICD-10-CM

## 2022-08-01 DIAGNOSIS — L57.3 POIKILODERMA OF CIVATTE: ICD-10-CM

## 2022-08-01 DIAGNOSIS — L82.1 OTHER SEBORRHEIC KERATOSIS: ICD-10-CM

## 2022-08-01 DIAGNOSIS — L73.8 OTHER SPECIFIED FOLLICULAR DISORDERS: ICD-10-CM

## 2022-08-01 DIAGNOSIS — L81.4 OTHER MELANIN HYPERPIGMENTATION: ICD-10-CM

## 2022-08-01 DIAGNOSIS — D22 MELANOCYTIC NEVI: ICD-10-CM

## 2022-08-01 DIAGNOSIS — L57.2 CUTIS RHOMBOIDALIS NUCHAE: ICD-10-CM

## 2022-08-01 PROBLEM — D18.01 HEMANGIOMA OF SKIN AND SUBCUTANEOUS TISSUE: Status: ACTIVE | Noted: 2022-08-01

## 2022-08-01 PROBLEM — D22.5 MELANOCYTIC NEVI OF TRUNK: Status: ACTIVE | Noted: 2022-08-01

## 2022-08-01 PROCEDURE — ? COUNSELING

## 2022-08-01 PROCEDURE — 17003 DESTRUCT PREMALG LES 2-14: CPT

## 2022-08-01 PROCEDURE — ? FULL BODY SKIN EXAM

## 2022-08-01 PROCEDURE — 17000 DESTRUCT PREMALG LESION: CPT

## 2022-08-01 PROCEDURE — 99203 OFFICE O/P NEW LOW 30 MIN: CPT | Mod: 25

## 2022-08-01 PROCEDURE — ? LIQUID NITROGEN

## 2022-08-01 PROCEDURE — ? ADDITIONAL NOTES

## 2022-08-01 PROCEDURE — ? TREATMENT REGIMEN

## 2022-08-01 ASSESSMENT — LOCATION DETAILED DESCRIPTION DERM
LOCATION DETAILED: RIGHT POSTERIOR SHOULDER
LOCATION DETAILED: LEFT SUPERIOR MEDIAL MIDBACK
LOCATION DETAILED: LEFT FOREHEAD
LOCATION DETAILED: LEFT SUPERIOR UPPER BACK
LOCATION DETAILED: SUPERIOR THORACIC SPINE
LOCATION DETAILED: LEFT MEDIAL INFERIOR CHEST
LOCATION DETAILED: LEFT LATERAL UPPER BACK
LOCATION DETAILED: RIGHT CLAVICULAR NECK
LOCATION DETAILED: PERIUMBILICAL SKIN
LOCATION DETAILED: RIGHT LATERAL TRAPEZIAL NECK
LOCATION DETAILED: RIGHT LATERAL FOREHEAD
LOCATION DETAILED: RIGHT INFERIOR UPPER BACK
LOCATION DETAILED: MEDIAL FRONTAL SCALP
LOCATION DETAILED: RIGHT RIB CAGE
LOCATION DETAILED: LEFT SUPERIOR ANTERIOR NECK
LOCATION DETAILED: LEFT MID-UPPER BACK
LOCATION DETAILED: RIGHT PROXIMAL POSTERIOR UPPER ARM
LOCATION DETAILED: RIGHT MID-UPPER BACK
LOCATION DETAILED: RIGHT ULNAR DORSAL HAND
LOCATION DETAILED: LEFT CENTRAL MALAR CHEEK
LOCATION DETAILED: LEFT RADIAL DORSAL HAND
LOCATION DETAILED: RIGHT MEDIAL INFERIOR CHEST
LOCATION DETAILED: LEFT SUPERIOR FOREHEAD
LOCATION DETAILED: LEFT LATERAL SUPERIOR CHEST
LOCATION DETAILED: RIGHT SUPERIOR FOREHEAD
LOCATION DETAILED: MID POSTERIOR NECK
LOCATION DETAILED: RIGHT FOREHEAD
LOCATION DETAILED: LEFT INFERIOR LATERAL MIDBACK
LOCATION DETAILED: LEFT MEDIAL FRONTAL SCALP

## 2022-08-01 ASSESSMENT — LOCATION ZONE DERM
LOCATION ZONE: FACE
LOCATION ZONE: HAND
LOCATION ZONE: SCALP
LOCATION ZONE: NECK
LOCATION ZONE: TRUNK
LOCATION ZONE: ARM

## 2022-08-01 ASSESSMENT — LOCATION SIMPLE DESCRIPTION DERM
LOCATION SIMPLE: RIGHT FOREHEAD
LOCATION SIMPLE: RIGHT HAND
LOCATION SIMPLE: ABDOMEN
LOCATION SIMPLE: LEFT FOREHEAD
LOCATION SIMPLE: RIGHT ANTERIOR NECK
LOCATION SIMPLE: RIGHT UPPER ARM
LOCATION SIMPLE: LEFT CHEEK
LOCATION SIMPLE: LEFT UPPER BACK
LOCATION SIMPLE: LEFT LOWER BACK
LOCATION SIMPLE: RIGHT UPPER BACK
LOCATION SIMPLE: LEFT ANTERIOR NECK
LOCATION SIMPLE: LEFT HAND
LOCATION SIMPLE: CHEST
LOCATION SIMPLE: UPPER BACK
LOCATION SIMPLE: LEFT SCALP
LOCATION SIMPLE: RIGHT SHOULDER
LOCATION SIMPLE: POSTERIOR NECK
LOCATION SIMPLE: FRONTAL SCALP

## 2022-08-01 NOTE — PROCEDURE: LIQUID NITROGEN
Number Of Freeze-Thaw Cycles: 2 freeze-thaw cycles
Render Post-Care Instructions In Note?: yes
Detail Level: Detailed
Consent: The patient's consent was obtained including but not limited to risks of crusting, scabbing, blistering, scarring, darker or lighter pigmentary change, recurrence, incomplete removal and infection.
Application Tool (Optional): Liquid Nitrogen Sprayer
Render Note In Bullet Format When Appropriate: No
Duration Of Freeze Thaw-Cycle (Seconds): 5
Post-Care Instructions: I reviewed with the patient in detail post-care instructions. Patient is to wear sunprotection, and avoid picking at any of the treated lesions. Pt may apply Vaseline to crusted or scabbing areas.
Duration Of Freeze Thaw-Cycle (Seconds): 10

## 2022-10-20 NOTE — PROCEDURAL SAFETY CHECKLIST WITH OR WITHOUT SEDATION - NSPREANESCONSENT_GEN_ALL_CORE
n/a
I performed the initial face to face bedside interview with this patient regarding history of present illness, review of symptoms and past medical, social and family history.  I completed an independent physical examination.  I was the initial provider who evaluated this patient.  The history, review of symptoms and examination was documented by the scribe in my presence and I attest to the accuracy of the documentation.  I have signed out the follow up of any pending tests (i.e. labs, radiological studies) to the ACP.  I have discussed the patient’s plan of care and disposition with the ACP.

## 2023-08-17 ENCOUNTER — APPOINTMENT (RX ONLY)
Dept: URBAN - METROPOLITAN AREA CLINIC 150 | Facility: CLINIC | Age: 68
Setting detail: DERMATOLOGY
End: 2023-08-17

## 2023-08-17 DIAGNOSIS — L57.2 CUTIS RHOMBOIDALIS NUCHAE: ICD-10-CM

## 2023-08-17 DIAGNOSIS — L72.0 EPIDERMAL CYST: ICD-10-CM

## 2023-08-17 DIAGNOSIS — L82.1 OTHER SEBORRHEIC KERATOSIS: ICD-10-CM

## 2023-08-17 DIAGNOSIS — L57.0 ACTINIC KERATOSIS: ICD-10-CM

## 2023-08-17 DIAGNOSIS — L81.4 OTHER MELANIN HYPERPIGMENTATION: ICD-10-CM

## 2023-08-17 DIAGNOSIS — L81.8 OTHER SPECIFIED DISORDERS OF PIGMENTATION: ICD-10-CM

## 2023-08-17 DIAGNOSIS — L73.8 OTHER SPECIFIED FOLLICULAR DISORDERS: ICD-10-CM

## 2023-08-17 DIAGNOSIS — L57.3 POIKILODERMA OF CIVATTE: ICD-10-CM

## 2023-08-17 DIAGNOSIS — D18.0 HEMANGIOMA: ICD-10-CM

## 2023-08-17 DIAGNOSIS — D22 MELANOCYTIC NEVI: ICD-10-CM

## 2023-08-17 PROBLEM — D18.01 HEMANGIOMA OF SKIN AND SUBCUTANEOUS TISSUE: Status: ACTIVE | Noted: 2023-08-17

## 2023-08-17 PROBLEM — D22.5 MELANOCYTIC NEVI OF TRUNK: Status: ACTIVE | Noted: 2023-08-17

## 2023-08-17 PROCEDURE — ? LIQUID NITROGEN

## 2023-08-17 PROCEDURE — 17003 DESTRUCT PREMALG LES 2-14: CPT | Mod: 59

## 2023-08-17 PROCEDURE — 17110 DESTRUCTION B9 LES UP TO 14: CPT

## 2023-08-17 PROCEDURE — 17000 DESTRUCT PREMALG LESION: CPT | Mod: 59

## 2023-08-17 PROCEDURE — ? FULL BODY SKIN EXAM

## 2023-08-17 PROCEDURE — ? TREATMENT REGIMEN

## 2023-08-17 PROCEDURE — 99213 OFFICE O/P EST LOW 20 MIN: CPT | Mod: 25

## 2023-08-17 PROCEDURE — ? COUNSELING

## 2023-08-17 ASSESSMENT — LOCATION SIMPLE DESCRIPTION DERM
LOCATION SIMPLE: CHEST
LOCATION SIMPLE: LEFT THIGH
LOCATION SIMPLE: RIGHT CHEEK
LOCATION SIMPLE: LEFT FOREHEAD
LOCATION SIMPLE: RIGHT CALF
LOCATION SIMPLE: LEFT EYEBROW
LOCATION SIMPLE: RIGHT SHOULDER
LOCATION SIMPLE: RIGHT THIGH
LOCATION SIMPLE: RIGHT PRETIBIAL REGION
LOCATION SIMPLE: FRONTAL SCALP
LOCATION SIMPLE: LEFT CHEEK
LOCATION SIMPLE: RIGHT ANTERIOR NECK
LOCATION SIMPLE: LEFT ANTERIOR NECK
LOCATION SIMPLE: LEFT LOWER BACK
LOCATION SIMPLE: LEFT HAND
LOCATION SIMPLE: LEFT UPPER BACK
LOCATION SIMPLE: LEFT FOREARM
LOCATION SIMPLE: UPPER BACK
LOCATION SIMPLE: RIGHT FOREHEAD
LOCATION SIMPLE: RIGHT FOREARM
LOCATION SIMPLE: ABDOMEN
LOCATION SIMPLE: RIGHT UPPER ARM
LOCATION SIMPLE: INFERIOR FOREHEAD
LOCATION SIMPLE: RIGHT HAND
LOCATION SIMPLE: POSTERIOR NECK
LOCATION SIMPLE: LEFT PRETIBIAL REGION
LOCATION SIMPLE: LEFT CALF
LOCATION SIMPLE: LEFT TEMPLE
LOCATION SIMPLE: LEFT UPPER ARM
LOCATION SIMPLE: ANTERIOR SCALP
LOCATION SIMPLE: RIGHT UPPER BACK

## 2023-08-17 ASSESSMENT — LOCATION DETAILED DESCRIPTION DERM
LOCATION DETAILED: RIGHT FOREHEAD
LOCATION DETAILED: LEFT CENTRAL TEMPLE
LOCATION DETAILED: LEFT ANTERIOR LATERAL PROXIMAL UPPER ARM
LOCATION DETAILED: LEFT SUPERIOR MEDIAL FOREHEAD
LOCATION DETAILED: RIGHT PROXIMAL POSTERIOR UPPER ARM
LOCATION DETAILED: RIGHT SUPERIOR CENTRAL MALAR CHEEK
LOCATION DETAILED: MID-FRONTAL SCALP
LOCATION DETAILED: LEFT LATERAL SUPERIOR CHEST
LOCATION DETAILED: RIGHT RIB CAGE
LOCATION DETAILED: RIGHT LATERAL TRAPEZIAL NECK
LOCATION DETAILED: INFERIOR MID FOREHEAD
LOCATION DETAILED: LEFT SUPERIOR ANTERIOR NECK
LOCATION DETAILED: RIGHT LATERAL FOREHEAD
LOCATION DETAILED: RIGHT PROXIMAL CALF
LOCATION DETAILED: PERIUMBILICAL SKIN
LOCATION DETAILED: LEFT MEDIAL INFERIOR CHEST
LOCATION DETAILED: LEFT DISTAL CALF
LOCATION DETAILED: RIGHT PROXIMAL PRETIBIAL REGION
LOCATION DETAILED: RIGHT DISTAL PRETIBIAL REGION
LOCATION DETAILED: LEFT LATERAL UPPER BACK
LOCATION DETAILED: MID POSTERIOR NECK
LOCATION DETAILED: RIGHT INFERIOR UPPER BACK
LOCATION DETAILED: RIGHT PROXIMAL DORSAL FOREARM
LOCATION DETAILED: LEFT CENTRAL MALAR CHEEK
LOCATION DETAILED: LEFT SUPERIOR MEDIAL MIDBACK
LOCATION DETAILED: MEDIAL FRONTAL SCALP
LOCATION DETAILED: LEFT SUPERIOR UPPER BACK
LOCATION DETAILED: RIGHT MID-UPPER BACK
LOCATION DETAILED: RIGHT MEDIAL INFERIOR CHEST
LOCATION DETAILED: LEFT ANTERIOR DISTAL THIGH
LOCATION DETAILED: RIGHT ANTERIOR DISTAL THIGH
LOCATION DETAILED: LEFT SUPERIOR FOREHEAD
LOCATION DETAILED: LEFT LATERAL EYEBROW
LOCATION DETAILED: LEFT PROXIMAL PRETIBIAL REGION
LOCATION DETAILED: LEFT INFERIOR LATERAL MIDBACK
LOCATION DETAILED: RIGHT SUPERIOR MEDIAL FOREHEAD
LOCATION DETAILED: LEFT RADIAL DORSAL HAND
LOCATION DETAILED: RIGHT POSTERIOR SHOULDER
LOCATION DETAILED: SUPERIOR THORACIC SPINE
LOCATION DETAILED: RIGHT ULNAR DORSAL HAND
LOCATION DETAILED: LEFT DISTAL DORSAL FOREARM
LOCATION DETAILED: RIGHT CLAVICULAR NECK
LOCATION DETAILED: LEFT FOREHEAD
LOCATION DETAILED: LEFT MID-UPPER BACK

## 2023-08-17 ASSESSMENT — LOCATION ZONE DERM
LOCATION ZONE: TRUNK
LOCATION ZONE: FACE
LOCATION ZONE: ARM
LOCATION ZONE: LEG
LOCATION ZONE: NECK
LOCATION ZONE: SCALP
LOCATION ZONE: HAND

## 2023-08-17 NOTE — PROCEDURE: LIQUID NITROGEN
Post-Care Instructions: I reviewed with the patient in detail post-care instructions. Patient is to wear sunprotection, and avoid picking at any of the treated lesions. Pt may apply Vaseline to crusted or scabbing areas.
Show Spray Paint Technique Variable?: Yes
Detail Level: Detailed
Spray Paint Text: The liquid nitrogen was applied to the skin utilizing a spray paint frosting technique.
Duration Of Freeze Thaw-Cycle (Seconds): 10-15
Consent: The patient's consent was obtained including but not limited to risks of crusting, scabbing, blistering, scarring, darker or lighter pigmentary change, recurrence, incomplete removal and infection.
Add 52 Modifier (Optional): no
Medical Necessity Information: It is in your best interest to select a reason for this procedure from the list below. All of these items fulfill various CMS LCD requirements except the new and changing color options.
Number Of Freeze-Thaw Cycles: 2 freeze-thaw cycles
Application Tool (Optional): Liquid Nitrogen Sprayer
Medical Necessity Clause: This procedure was medically necessary because the lesions that were treated were:
Detail Level: Zone
Duration Of Freeze Thaw-Cycle (Seconds): 5

## 2023-09-29 NOTE — ED PROVIDER NOTE - CROS ED [facility] ALL NEG
RD consult for heart failure education. Attached copy of 2 gm sodium diet to AVS. Refer pt to outpatient RD for further follow up as needed.    negative...

## 2023-10-01 PROBLEM — M54.16 LUMBAR RADICULAR PAIN: Status: RESOLVED | Noted: 2021-04-06 | Resolved: 2021-08-16

## 2023-12-05 NOTE — ED ADULT NURSE NOTE - CHIEF COMPLAINT QUOTE
Received in basket message from Pete Sharma RN requesting assistance with tube feedings.     YULIYA sent message to Ruth Cardenas RD to discuss insurance coverage/feedings. Suzy Moreno RD also looped in conversation for further assistance and placed orders for consult (pt was not set up with tube feeds upon hospital d/c). RD to assess tube feeding/formula needs.     YULIYA called patient's wife Adela to provide update and assess home infusion and/or home health needs. Spouse states pt denied needing HH when he was in the hospital recently and states they administered a tube feeding yesterday with no difficulty. Wife expresses questions regarding high heart rate and reassured her that SW would send message to RN.     Suzy Moreno RD scheduled virtual visit tomorrow 12/6 with patient and will clarify what needs are pertinent moving forward. YULIYA sent update to Pete Sharma RN and Kristi Cruz RN Navigator via in basket.     
"My heart has been beating fast off and on since yesterday."

## 2024-08-21 ENCOUNTER — APPOINTMENT (RX ONLY)
Dept: URBAN - METROPOLITAN AREA CLINIC 150 | Facility: CLINIC | Age: 69
Setting detail: DERMATOLOGY
End: 2024-08-21

## 2024-08-21 DIAGNOSIS — L81.4 OTHER MELANIN HYPERPIGMENTATION: ICD-10-CM

## 2024-08-21 DIAGNOSIS — L57.3 POIKILODERMA OF CIVATTE: ICD-10-CM

## 2024-08-21 DIAGNOSIS — L82.1 OTHER SEBORRHEIC KERATOSIS: ICD-10-CM

## 2024-08-21 DIAGNOSIS — D22 MELANOCYTIC NEVI: ICD-10-CM

## 2024-08-21 DIAGNOSIS — L57.0 ACTINIC KERATOSIS: ICD-10-CM

## 2024-08-21 DIAGNOSIS — L81.8 OTHER SPECIFIED DISORDERS OF PIGMENTATION: ICD-10-CM

## 2024-08-21 DIAGNOSIS — D18.0 HEMANGIOMA: ICD-10-CM

## 2024-08-21 DIAGNOSIS — L57.2 CUTIS RHOMBOIDALIS NUCHAE: ICD-10-CM

## 2024-08-21 DIAGNOSIS — L72.0 EPIDERMAL CYST: ICD-10-CM

## 2024-08-21 DIAGNOSIS — L72.8 OTHER FOLLICULAR CYSTS OF THE SKIN AND SUBCUTANEOUS TISSUE: ICD-10-CM

## 2024-08-21 DIAGNOSIS — L73.8 OTHER SPECIFIED FOLLICULAR DISORDERS: ICD-10-CM

## 2024-08-21 PROBLEM — D18.01 HEMANGIOMA OF SKIN AND SUBCUTANEOUS TISSUE: Status: ACTIVE | Noted: 2024-08-21

## 2024-08-21 PROBLEM — D22.5 MELANOCYTIC NEVI OF TRUNK: Status: ACTIVE | Noted: 2024-08-21

## 2024-08-21 PROCEDURE — 17003 DESTRUCT PREMALG LES 2-14: CPT

## 2024-08-21 PROCEDURE — ? DEFER

## 2024-08-21 PROCEDURE — 99213 OFFICE O/P EST LOW 20 MIN: CPT | Mod: 25

## 2024-08-21 PROCEDURE — 17000 DESTRUCT PREMALG LESION: CPT

## 2024-08-21 PROCEDURE — ? LIQUID NITROGEN

## 2024-08-21 PROCEDURE — ? COUNSELING

## 2024-08-21 PROCEDURE — ? TREATMENT REGIMEN

## 2024-08-21 PROCEDURE — ? FULL BODY SKIN EXAM

## 2024-08-21 ASSESSMENT — LOCATION DETAILED DESCRIPTION DERM
LOCATION DETAILED: RIGHT PROXIMAL PRETIBIAL REGION
LOCATION DETAILED: LEFT POSTERIOR ANKLE
LOCATION DETAILED: LEFT SUPERIOR ANTERIOR NECK
LOCATION DETAILED: LEFT DORSAL FOOT
LOCATION DETAILED: LEFT RADIAL DORSAL HAND
LOCATION DETAILED: LEFT ANTERIOR LATERAL PROXIMAL UPPER ARM
LOCATION DETAILED: LEFT DISTAL DORSAL FOREARM
LOCATION DETAILED: RIGHT INFERIOR UPPER BACK
LOCATION DETAILED: RIGHT PROXIMAL POSTERIOR UPPER ARM
LOCATION DETAILED: SUPERIOR THORACIC SPINE
LOCATION DETAILED: RIGHT SUPERIOR CENTRAL MALAR CHEEK
LOCATION DETAILED: RIGHT PROXIMAL CALF
LOCATION DETAILED: LEFT SUPERIOR UPPER BACK
LOCATION DETAILED: RIGHT ANKLE
LOCATION DETAILED: RIGHT MID-UPPER BACK
LOCATION DETAILED: LEFT MEDIAL FRONTAL SCALP
LOCATION DETAILED: LEFT PROXIMAL PRETIBIAL REGION
LOCATION DETAILED: LEFT MID-UPPER BACK
LOCATION DETAILED: MEDIAL FRONTAL SCALP
LOCATION DETAILED: LEFT SUPERIOR PARIETAL SCALP
LOCATION DETAILED: LEFT DISTAL CALF
LOCATION DETAILED: RIGHT CLAVICULAR NECK
LOCATION DETAILED: RIGHT LATERAL TRAPEZIAL NECK
LOCATION DETAILED: RIGHT FOREHEAD
LOCATION DETAILED: LEFT CENTRAL MALAR CHEEK
LOCATION DETAILED: RIGHT ULNAR DORSAL HAND
LOCATION DETAILED: LEFT SUPERIOR MEDIAL MIDBACK
LOCATION DETAILED: RIGHT POSTERIOR SHOULDER
LOCATION DETAILED: RIGHT MEDIAL INFERIOR CHEST
LOCATION DETAILED: LEFT SUPERIOR FOREHEAD
LOCATION DETAILED: RIGHT LATERAL FOREHEAD
LOCATION DETAILED: RIGHT ACHILLES SKIN
LOCATION DETAILED: RIGHT RIB CAGE
LOCATION DETAILED: LEFT LATERAL UPPER BACK
LOCATION DETAILED: LEFT MEDIAL INFERIOR CHEST
LOCATION DETAILED: RIGHT ANTERIOR DISTAL THIGH
LOCATION DETAILED: MID POSTERIOR NECK
LOCATION DETAILED: LEFT LATERAL SUPERIOR CHEST
LOCATION DETAILED: LEFT ANTERIOR DISTAL THIGH
LOCATION DETAILED: LEFT LATERAL EYEBROW
LOCATION DETAILED: RIGHT PROXIMAL DORSAL FOREARM
LOCATION DETAILED: PERIUMBILICAL SKIN
LOCATION DETAILED: LEFT CENTRAL TEMPLE
LOCATION DETAILED: LEFT FOREHEAD
LOCATION DETAILED: LEFT INFERIOR LATERAL MIDBACK

## 2024-08-21 ASSESSMENT — LOCATION SIMPLE DESCRIPTION DERM
LOCATION SIMPLE: LEFT FOREARM
LOCATION SIMPLE: RIGHT UPPER BACK
LOCATION SIMPLE: RIGHT ACHILLES SKIN
LOCATION SIMPLE: RIGHT CHEEK
LOCATION SIMPLE: RIGHT SHOULDER
LOCATION SIMPLE: RIGHT CALF
LOCATION SIMPLE: SCALP
LOCATION SIMPLE: ABDOMEN
LOCATION SIMPLE: LEFT SCALP
LOCATION SIMPLE: LEFT FOREHEAD
LOCATION SIMPLE: LEFT HAND
LOCATION SIMPLE: RIGHT THIGH
LOCATION SIMPLE: LEFT CALF
LOCATION SIMPLE: LEFT CHEEK
LOCATION SIMPLE: UPPER BACK
LOCATION SIMPLE: LEFT LOWER BACK
LOCATION SIMPLE: RIGHT FOREARM
LOCATION SIMPLE: LEFT UPPER ARM
LOCATION SIMPLE: RIGHT HAND
LOCATION SIMPLE: RIGHT ANTERIOR NECK
LOCATION SIMPLE: LEFT UPPER BACK
LOCATION SIMPLE: CHEST
LOCATION SIMPLE: RIGHT PRETIBIAL REGION
LOCATION SIMPLE: LEFT ANTERIOR NECK
LOCATION SIMPLE: LEFT THIGH
LOCATION SIMPLE: LEFT ANKLE
LOCATION SIMPLE: LEFT PRETIBIAL REGION
LOCATION SIMPLE: LEFT FOOT
LOCATION SIMPLE: LEFT TEMPLE
LOCATION SIMPLE: LEFT EYEBROW
LOCATION SIMPLE: RIGHT FOREHEAD
LOCATION SIMPLE: RIGHT ANKLE
LOCATION SIMPLE: POSTERIOR NECK
LOCATION SIMPLE: FRONTAL SCALP
LOCATION SIMPLE: RIGHT UPPER ARM

## 2024-08-21 ASSESSMENT — LOCATION ZONE DERM
LOCATION ZONE: NECK
LOCATION ZONE: SCALP
LOCATION ZONE: LEG
LOCATION ZONE: FEET
LOCATION ZONE: ARM
LOCATION ZONE: FACE
LOCATION ZONE: HAND
LOCATION ZONE: TRUNK

## 2024-08-21 NOTE — PROCEDURE: DEFER
Detail Level: Detailed
Introduction Text (Please End With A Colon): Patient is referred to,
Size Of Lesion In Cm (Optional): 0

## 2024-08-21 NOTE — PROCEDURE: TREATMENT REGIMEN
Initiate Treatment: Patient advised to apply CeraVe sunscreen 30+ daily, reapply every two hours to areas of sun exposed skin
Detail Level: Zone

## 2024-08-21 NOTE — PROCEDURE: LIQUID NITROGEN
Application Tool (Optional): Liquid Nitrogen Sprayer
Render Note In Bullet Format When Appropriate: No
Number Of Freeze-Thaw Cycles: 2 freeze-thaw cycles
Render Post-Care Instructions In Note?: yes
Duration Of Freeze Thaw-Cycle (Seconds): 5
Post-Care Instructions: I reviewed with the patient in detail post-care instructions. Patient is to wear sunprotection, and avoid picking at any of the treated lesions. Pt may apply Vaseline to crusted or scabbing areas.
Consent: The patient's consent was obtained including but not limited to risks of crusting, scabbing, blistering, scarring, darker or lighter pigmentary change, recurrence, incomplete removal and infection.
Detail Level: Simple
